# Patient Record
Sex: MALE | URBAN - METROPOLITAN AREA
[De-identification: names, ages, dates, MRNs, and addresses within clinical notes are randomized per-mention and may not be internally consistent; named-entity substitution may affect disease eponyms.]

---

## 2022-01-10 ENCOUNTER — INPATIENT (INPATIENT)
Facility: HOSPITAL | Age: 72
LOS: 2 days | Discharge: HOME | DRG: 280 | End: 2022-01-13
Attending: INTERNAL MEDICINE | Admitting: FAMILY MEDICINE
Payer: MEDICARE

## 2022-01-10 VITALS
OXYGEN SATURATION: 99 % | SYSTOLIC BLOOD PRESSURE: 149 MMHG | WEIGHT: 88.63 LBS | HEART RATE: 66 BPM | HEIGHT: 60 IN | DIASTOLIC BLOOD PRESSURE: 92 MMHG | RESPIRATION RATE: 18 BRPM | TEMPERATURE: 98 F

## 2022-01-10 DIAGNOSIS — F10.10 ALCOHOL ABUSE, UNCOMPLICATED: ICD-10-CM

## 2022-01-10 DIAGNOSIS — I25.2 OLD MYOCARDIAL INFARCTION: ICD-10-CM

## 2022-01-10 DIAGNOSIS — G40.309 GENERALIZED IDIOPATHIC EPILEPSY AND EPILEPTIC SYNDROMES, NOT INTRACTABLE, WITHOUT STATUS EPILEPTICUS: ICD-10-CM

## 2022-01-10 DIAGNOSIS — I25.10 ATHEROSCLEROTIC HEART DISEASE OF NATIVE CORONARY ARTERY WITHOUT ANGINA PECTORIS: ICD-10-CM

## 2022-01-10 DIAGNOSIS — Z91.14 PATIENT'S OTHER NONCOMPLIANCE WITH MEDICATION REGIMEN: ICD-10-CM

## 2022-01-10 DIAGNOSIS — E55.9 VITAMIN D DEFICIENCY, UNSPECIFIED: ICD-10-CM

## 2022-01-10 DIAGNOSIS — I21.4 NON-ST ELEVATION (NSTEMI) MYOCARDIAL INFARCTION: ICD-10-CM

## 2022-01-10 DIAGNOSIS — I73.9 PERIPHERAL VASCULAR DISEASE, UNSPECIFIED: ICD-10-CM

## 2022-01-10 DIAGNOSIS — I10 ESSENTIAL (PRIMARY) HYPERTENSION: ICD-10-CM

## 2022-01-10 DIAGNOSIS — I70.211 ATHEROSCLEROSIS OF NATIVE ARTERIES OF EXTREMITIES WITH INTERMITTENT CLAUDICATION, RIGHT LEG: ICD-10-CM

## 2022-01-10 DIAGNOSIS — M62.82 RHABDOMYOLYSIS: ICD-10-CM

## 2022-01-10 DIAGNOSIS — E43 UNSPECIFIED SEVERE PROTEIN-CALORIE MALNUTRITION: ICD-10-CM

## 2022-01-10 DIAGNOSIS — Z95.820 PERIPHERAL VASCULAR ANGIOPLASTY STATUS WITH IMPLANTS AND GRAFTS: ICD-10-CM

## 2022-01-10 DIAGNOSIS — F17.210 NICOTINE DEPENDENCE, CIGARETTES, UNCOMPLICATED: ICD-10-CM

## 2022-01-10 DIAGNOSIS — Z95.5 PRESENCE OF CORONARY ANGIOPLASTY IMPLANT AND GRAFT: ICD-10-CM

## 2022-01-10 DIAGNOSIS — D64.9 ANEMIA, UNSPECIFIED: ICD-10-CM

## 2022-01-10 PROCEDURE — 99223 1ST HOSP IP/OBS HIGH 75: CPT

## 2022-01-10 PROCEDURE — 86803 HEPATITIS C AB TEST: CPT

## 2022-01-10 PROCEDURE — 78452 HT MUSCLE IMAGE SPECT MULT: CPT

## 2022-01-10 PROCEDURE — 85027 COMPLETE CBC AUTOMATED: CPT

## 2022-01-10 PROCEDURE — 36415 COLL VENOUS BLD VENIPUNCTURE: CPT

## 2022-01-10 PROCEDURE — 84484 ASSAY OF TROPONIN QUANT: CPT

## 2022-01-10 PROCEDURE — 80053 COMPREHEN METABOLIC PANEL: CPT

## 2022-01-10 PROCEDURE — 85025 COMPLETE CBC W/AUTO DIFF WBC: CPT

## 2022-01-10 PROCEDURE — U0003: CPT

## 2022-01-10 PROCEDURE — 93017 CV STRESS TEST TRACING ONLY: CPT

## 2022-01-10 PROCEDURE — A9500: CPT

## 2022-01-10 PROCEDURE — 82550 ASSAY OF CK (CPK): CPT

## 2022-01-10 PROCEDURE — 80061 LIPID PANEL: CPT

## 2022-01-10 PROCEDURE — U0005: CPT

## 2022-01-10 PROCEDURE — 93306 TTE W/DOPPLER COMPLETE: CPT

## 2022-01-10 PROCEDURE — 84145 PROCALCITONIN (PCT): CPT

## 2022-01-10 PROCEDURE — 93005 ELECTROCARDIOGRAM TRACING: CPT

## 2022-01-10 PROCEDURE — 83036 HEMOGLOBIN GLYCOSYLATED A1C: CPT

## 2022-01-10 NOTE — H&P ADULT - REASON FOR ADMISSION
NSTEMI  Elevated Troponin  Seizure Disorder  ETOH Abuse NSTEMI  Elevated Troponin  Seizure Disorder  Possible Rhabdomyolysis  ETOH Abuse

## 2022-01-10 NOTE — H&P ADULT - NSHPSOCIALHISTORY_GEN_ALL_CORE
Pt lives with his wife.  He states he is an only child and he does not have children . He uses a cane.  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  He reports last marijuana and crack cocaine use as 2018 at which time he had an MI.  He denies IVDA or other drug use.  Pt drinks beer during the week. He reports 2 cigarettes/daily.

## 2022-01-10 NOTE — H&P ADULT - CONVERSATION DETAILS
Pt's wife Mag Wu, would be his HCP. Pt would like to be Full Code and he states his  wife Mag Wu, would be his HCP.

## 2022-01-10 NOTE — H&P ADULT - NSICDXPASTMEDICALHX_GEN_ALL_CORE_FT
PAST MEDICAL HISTORY:  Alcoholism     Hypertension     PUD (peptic ulcer disease)     Seizure disorder

## 2022-01-10 NOTE — H&P ADULT - HISTORY OF PRESENT ILLNESS
Pt is a pleasant 70 y/o AA Male with PMHX: HTN , MI,  PUD , Alcoholism who was transferred to [facility] from [facility] for Off Loading.  He was admitted to [facility] on 1/9/2022 after a Generalized Tonic Clonic seizure at home and again in the ED.    Pt had been non-compliant with his Keppra and other medications due to his alcohol use.   At [facility] , pt reportedly had chest pain,  an NSTEMI, mild MANUEL and concern for possible Rhabdomyolysis.   He was noted to have anemia H/H 10/35, MCV 79, WBC 13.8  In the ED he received Keppra loading dose of 1000 mg,  NS of 1L bolus.   Pt was AAOx3,  and had a CIWA score of 0.  Pt had a troponin increase from 293 to 2193, and  to 1,368.     He was started on plavix and ASA , then continued with plavix 75mg and lovenox 50 mg .    Initial EKG:  NSR 70 bpm om 1/9/2022 then NSR 65 bpm on 1/10/2022.    Repeat labs also reveal Vit D, 25 hydroxy  13.0,  TSH 0.395,  F T4 1.35, Bun 17.8, Cr 1.33,  GFR 61.0.  Cxray showed probably basilar atelectasis,  CT brain:  mild diffuse cerebral atrophy, chronic white matter microvascular disease.         Pt has no complaints at this time and reports he last drank alcohol three days ago, a can of beer.  He denies current chest pain, no abd pain, no calf pain/swelling.  No fever/chills, no respiratory, urinary or abdominal symptoms.      Pt is covid neg and has been vaccinated.  Pt is a pleasant 70 y/o AA Male with PMHX: HTN , MI,  PUD , Alcoholism who was transferred to [facility] from [facility] for Off Loading.  He was admitted to [facility] on 1/9/2022 after a Generalized Tonic Clonic seizure at home and again in the ED.    Pt had been non-compliant with his Keppra and other medications due to his alcohol use.   At [facility] , pt reportedly had chest pain,  an NSTEMI, mild MANUEL and concern for possible Rhabdomyolysis.   He was noted to have anemia H/H 10/35, MCV 79, WBC 13.8  In the ED he received Keppra loading dose of 1000 mg,  NS of 1L bolus.   Pt was AAOx3,  and had a CIWA score of 0.  Pt had a troponin increase from 293 to 2193, and  to 1,368.     He was started on plavix and ASA , then continued with plavix 75mg and lovenox 50 mg .    Initial EKG:  NSR 70 bpm om 1/9/2022 then NSR 65 bpm on 1/10/2022.    Repeat labs also reveal Vit D, 25 hydroxy  13.0,  TSH 0.395,  F T4 1.35, Bun 17.8, Cr 1.33,  GFR 61.0.  Cxray showed probably basilar atelectasis,  CT brain:  mild diffuse cerebral atrophy, chronic white matter microvascular disease.         Pt has no complaints at this time and reports he last drank alcohol three days ago, a can of beer.  He denies current chest pain, no abd pain, no calf pain/swelling.  No fever/chills, no respiratory, urinary or abdominal symptoms.        Pt is covid neg and has been vaccinated.

## 2022-01-10 NOTE — H&P ADULT - ASSESSMENT
Pt is admitted w/    NSTEMI  Elevated Troponin  Seizure Disorder  Possible Rhabdomyolysis  ETOH Abuse  CIWA protocol PRN  -direct admit,   admitted to telemetry  - cont Keppra 500mg po BID  - cont  ml/hr  - follow CPK and troponins, renal function  - cont lovenox 50 units,plavix 75mg,  metoprolol  - echo and cardiology consult  - cont thiamine, folate, MVI,  Mag  - DVT proph: pt on lovenox  - Adv Dir: Full Code Pt is admitted w/    NSTEMI  Elevated Troponin  Seizure Disorder  Possible Rhabdomyolysis  ETOH Abuse  CIWA protocol PRN  -direct admit,   admitted to telemetry  - cont Keppra 500mg po BID  - seizure precautions  - cont  ml/hr  - follow CPK and troponins, renal function  - cont lovenox 50 units,plavix 75mg,  metoprolol  - echo and cardiology consult  - cont thiamine, folate, MVI,  Mag  - f/u blood cx drawn 1/9/22 from [facility]  - DVT proph: pt on lovenox  - Adv Dir: Full Code Pt is admitted w/    NSTEMI  Elevated Troponin  Seizure Disorder  Possible Rhabdomyolysis  ETOH Abuse  CIWA protocol PRN  VIt D defic  -direct admit,   admitted to telemetry  - cont Keppra 500mg po BID  - seizure precautions  - cont  ml/hr  - follow CPK and troponins, renal function  - cont lovenox 50 units,plavix 75mg,  metoprolol  - echo and cardiology consult, check fasting lipids  - add statin if LFTS wnl  - cont thiamine, folate, MVI,  Mag, Vit D  - f/u blood cx drawn 1/9/22 from [facility]  - DVT proph: pt on lovenox  - Adv Dir: Full Code

## 2022-01-10 NOTE — PATIENT PROFILE ADULT - FALL HARM RISK - HARM RISK INTERVENTIONS

## 2022-01-10 NOTE — H&P ADULT - NSHPPHYSICALEXAM_GEN_ALL_CORE
ICU Vital Signs Last 24 Hrs  T(C): 36.8 (10 Luis 2022 22:38), Max: 36.8 (10 Luis 2022 22:38)  T(F): 98.2 (10 Luis 2022 22:38), Max: 98.2 (10 Luis 2022 22:38)  HR: 66 (10 Luis 2022 22:38) (66 - 66)  BP: 149/92 (10 Luis 2022 22:38) (149/92 - 149/92)    RR: 18 (10 Luis 2022 22:38) (18 - 18)  SpO2: 99% (10 Luis 2022 22:38) (99% - 99%) ICU Vital Signs Last 24 Hrs  T(C): 36.8 (10 Luis 2022 22:38), Max: 36.8 (10 Luis 2022 22:38)  T(F): 98.2 (10 Luis 2022 22:38), Max: 98.2 (10 Luis 2022 22:38)  HR: 66 (10 Luis 2022 22:38) (66 - 66)  BP: 149/92 (10 Luis 2022 22:38) (149/92 - 149/92)  RR: 18 (10 Luis 2022 22:38) (18 - 18)  SpO2: 99% (10 Luis 2022 22:38) (99% - 99%)      PHYSICAL EXAM:  Constitutional: pt is a slender  male with short stature  Eyes: DORIAN  ENMT: ears, nose externally wnl, membranes ,  pt has poor dentition and is missing many teeth  Neck:supple, no JVD  Breasts: not examined  Back: no deformity noted,  pt able to sit up without assistance  Respiratory: CTA  Cardiovascular: S1S2, no murmer  Gastrointestinal: soft, non-tender , + bs,  healed midline abd scar.  Genitourinary: normal male genitalia  Rectal: deferred  Extremities:  negative edema, no calf pain,  clubbing of fingers noted  Neurological: AAOx3,  no focal deficits noted  Skin: no acute rash   Musculoskeletal: FROM  Psychiatric: AAOx3 ICU Vital Signs Last 24 Hrs  T(C): 36.8 (10 Luis 2022 22:38), Max: 36.8 (10 Luis 2022 22:38)  T(F): 98.2 (10 Luis 2022 22:38), Max: 98.2 (10 Luis 2022 22:38)  HR: 66 (10 Luis 2022 22:38) (66 - 66)  BP: 149/92 (10 Luis 2022 22:38) (149/92 - 149/92)  RR: 18 (10 Luis 2022 22:38) (18 - 18)  SpO2: 99% (10 Luis 2022 22:38) (99% - 99%)      PHYSICAL EXAM:  Constitutional: pt is a slender  male with short stature  Eyes: DORIAN  ENMT: ears, nose externally wnl, membranes ,  pt has poor dentition and is missing many teeth  Neck:supple, no JVD  Breasts: not examined  Back: no deformity noted,  pt able to sit up without assistance  Respiratory: CTA  Cardiovascular: S1S2, no murmer  Gastrointestinal: soft, non-tender , + bs,  healed midline abd scar.  Genitourinary: normal male genitalia  Rectal: deferred  Extremities:  negative edema, no calf pain,  clubbing of fingers noted  Neurological: AAOx3,  no focal deficits noted, no tremor, no tongue fasciculations, speech fluent and clear  Skin: no acute rash   Musculoskeletal: FROM  Psychiatric: AAOx3,  following commands ICU Vital Signs Last 24 Hrs  T(C): 36.8 (10 Luis 2022 22:38), Max: 36.8 (10 Luis 2022 22:38)  T(F): 98.2 (10 Luis 2022 22:38), Max: 98.2 (10 Luis 2022 22:38)  HR: 66 (10 Luis 2022 22:38) (66 - 66)  BP: 149/92 (10 Luis 2022 22:38) (149/92 - 149/92)  RR: 18 (10 Luis 2022 22:38) (18 - 18)  SpO2: 99% (10 Luis 2022 22:38) (99% - 99%)    Height 4' 10""  Weight 40 kg    PHYSICAL EXAM:  Constitutional: pt is a slender  male with short stature  Eyes: DORIAN  ENMT: ears, nose externally wnl, membranes ,  pt has poor dentition and is missing many teeth  Neck:supple, no JVD  Breasts: not examined  Back: no deformity noted,  pt able to sit up without assistance  Respiratory: CTA  Cardiovascular: S1S2, no murmer  Gastrointestinal: soft, non-tender , + bs,  healed midline abd scar.  Genitourinary: normal male genitalia  Rectal: deferred  Extremities:  negative edema, no calf pain,  clubbing of fingers noted  Neurological: AAOx3,  no focal deficits noted, no tremor, no tongue fasciculations, speech fluent and clear  Skin: no acute rash   Musculoskeletal: FROM  Psychiatric: AAOx3,  following commands

## 2022-01-11 DIAGNOSIS — I73.9 PERIPHERAL VASCULAR DISEASE, UNSPECIFIED: ICD-10-CM

## 2022-01-11 DIAGNOSIS — I10 ESSENTIAL (PRIMARY) HYPERTENSION: ICD-10-CM

## 2022-01-11 DIAGNOSIS — R77.8 OTHER SPECIFIED ABNORMALITIES OF PLASMA PROTEINS: ICD-10-CM

## 2022-01-11 DIAGNOSIS — I25.10 ATHEROSCLEROTIC HEART DISEASE OF NATIVE CORONARY ARTERY WITHOUT ANGINA PECTORIS: ICD-10-CM

## 2022-01-11 DIAGNOSIS — K27.9 PEPTIC ULCER, SITE UNSPECIFIED, UNSPECIFIED AS ACUTE OR CHRONIC, WITHOUT HEMORRHAGE OR PERFORATION: Chronic | ICD-10-CM

## 2022-01-11 LAB
A1C WITH ESTIMATED AVERAGE GLUCOSE RESULT: 5.4 % — SIGNIFICANT CHANGE UP (ref 4–5.6)
ALBUMIN SERPL ELPH-MCNC: 2.9 G/DL — LOW (ref 3.3–5)
ALP SERPL-CCNC: 95 U/L — SIGNIFICANT CHANGE UP (ref 40–120)
ALT FLD-CCNC: 32 U/L — SIGNIFICANT CHANGE UP (ref 12–78)
ANION GAP SERPL CALC-SCNC: 6 MMOL/L — SIGNIFICANT CHANGE UP (ref 5–17)
AST SERPL-CCNC: 47 U/L — HIGH (ref 15–37)
BILIRUB SERPL-MCNC: 0.3 MG/DL — SIGNIFICANT CHANGE UP (ref 0.2–1.2)
BUN SERPL-MCNC: 16 MG/DL — SIGNIFICANT CHANGE UP (ref 7–23)
CALCIUM SERPL-MCNC: 8.5 MG/DL — SIGNIFICANT CHANGE UP (ref 8.5–10.1)
CHLORIDE SERPL-SCNC: 107 MMOL/L — SIGNIFICANT CHANGE UP (ref 96–108)
CHOLEST SERPL-MCNC: 172 MG/DL — SIGNIFICANT CHANGE UP
CK SERPL-CCNC: 1209 U/L — HIGH (ref 26–308)
CO2 SERPL-SCNC: 24 MMOL/L — SIGNIFICANT CHANGE UP (ref 22–31)
CREAT SERPL-MCNC: 1.32 MG/DL — HIGH (ref 0.5–1.3)
ESTIMATED AVERAGE GLUCOSE: 108 MG/DL — SIGNIFICANT CHANGE UP (ref 68–114)
GLUCOSE SERPL-MCNC: 94 MG/DL — SIGNIFICANT CHANGE UP (ref 70–99)
HCT VFR BLD CALC: 29.1 % — LOW (ref 39–50)
HCV AB S/CO SERPL IA: 0.69 S/CO — SIGNIFICANT CHANGE UP (ref 0–0.99)
HCV AB SERPL-IMP: SIGNIFICANT CHANGE UP
HDLC SERPL-MCNC: 92 MG/DL — SIGNIFICANT CHANGE UP
HGB BLD-MCNC: 8.4 G/DL — LOW (ref 13–17)
LIPID PNL WITH DIRECT LDL SERPL: 60 MG/DL — SIGNIFICANT CHANGE UP
MCHC RBC-ENTMCNC: 22.5 PG — LOW (ref 27–34)
MCHC RBC-ENTMCNC: 28.9 GM/DL — LOW (ref 32–36)
MCV RBC AUTO: 78 FL — LOW (ref 80–100)
NON HDL CHOLESTEROL: 81 MG/DL — SIGNIFICANT CHANGE UP
PLATELET # BLD AUTO: 342 K/UL — SIGNIFICANT CHANGE UP (ref 150–400)
POTASSIUM SERPL-MCNC: 4 MMOL/L — SIGNIFICANT CHANGE UP (ref 3.5–5.3)
POTASSIUM SERPL-SCNC: 4 MMOL/L — SIGNIFICANT CHANGE UP (ref 3.5–5.3)
PROT SERPL-MCNC: 7.3 GM/DL — SIGNIFICANT CHANGE UP (ref 6–8.3)
RBC # BLD: 3.73 M/UL — LOW (ref 4.2–5.8)
RBC # FLD: 20 % — HIGH (ref 10.3–14.5)
SARS-COV-2 RNA SPEC QL NAA+PROBE: SIGNIFICANT CHANGE UP
SODIUM SERPL-SCNC: 137 MMOL/L — SIGNIFICANT CHANGE UP (ref 135–145)
TRIGL SERPL-MCNC: 104 MG/DL — SIGNIFICANT CHANGE UP
TROPONIN I, HIGH SENSITIVITY RESULT: 1089.83 NG/L — HIGH
TROPONIN I, HIGH SENSITIVITY RESULT: 1351.1 NG/L — HIGH
WBC # BLD: 9.84 K/UL — SIGNIFICANT CHANGE UP (ref 3.8–10.5)
WBC # FLD AUTO: 9.84 K/UL — SIGNIFICANT CHANGE UP (ref 3.8–10.5)

## 2022-01-11 PROCEDURE — 93306 TTE W/DOPPLER COMPLETE: CPT | Mod: 26

## 2022-01-11 PROCEDURE — 99223 1ST HOSP IP/OBS HIGH 75: CPT

## 2022-01-11 PROCEDURE — 93010 ELECTROCARDIOGRAM REPORT: CPT

## 2022-01-11 PROCEDURE — 99232 SBSQ HOSP IP/OBS MODERATE 35: CPT

## 2022-01-11 RX ORDER — METOPROLOL TARTRATE 50 MG
25 TABLET ORAL EVERY 12 HOURS
Refills: 0 | Status: DISCONTINUED | OUTPATIENT
Start: 2022-01-10 | End: 2022-01-13

## 2022-01-11 RX ORDER — ATORVASTATIN CALCIUM 80 MG/1
20 TABLET, FILM COATED ORAL AT BEDTIME
Refills: 0 | Status: DISCONTINUED | OUTPATIENT
Start: 2022-01-10 | End: 2022-01-11

## 2022-01-11 RX ORDER — ENOXAPARIN SODIUM 100 MG/ML
50 INJECTION SUBCUTANEOUS DAILY
Refills: 0 | Status: DISCONTINUED | OUTPATIENT
Start: 2022-01-11 | End: 2022-01-11

## 2022-01-11 RX ORDER — MAGNESIUM OXIDE 400 MG ORAL TABLET 241.3 MG
400 TABLET ORAL
Refills: 0 | Status: DISCONTINUED | OUTPATIENT
Start: 2022-01-11 | End: 2022-01-13

## 2022-01-11 RX ORDER — LEVETIRACETAM 250 MG/1
500 TABLET, FILM COATED ORAL
Refills: 0 | Status: DISCONTINUED | OUTPATIENT
Start: 2022-01-11 | End: 2022-01-13

## 2022-01-11 RX ORDER — THIAMINE MONONITRATE (VIT B1) 100 MG
100 TABLET ORAL DAILY
Refills: 0 | Status: DISCONTINUED | OUTPATIENT
Start: 2022-01-11 | End: 2022-01-13

## 2022-01-11 RX ORDER — FOLIC ACID 0.8 MG
1 TABLET ORAL DAILY
Refills: 0 | Status: DISCONTINUED | OUTPATIENT
Start: 2022-01-11 | End: 2022-01-13

## 2022-01-11 RX ORDER — FOLIC ACID 0.8 MG
1 TABLET ORAL
Qty: 0 | Refills: 0 | DISCHARGE

## 2022-01-11 RX ORDER — ENOXAPARIN SODIUM 100 MG/ML
40 INJECTION SUBCUTANEOUS
Refills: 0 | Status: DISCONTINUED | OUTPATIENT
Start: 2022-01-11 | End: 2022-01-13

## 2022-01-11 RX ORDER — CLOPIDOGREL BISULFATE 75 MG/1
75 TABLET, FILM COATED ORAL DAILY
Refills: 0 | Status: DISCONTINUED | OUTPATIENT
Start: 2022-01-10 | End: 2022-01-13

## 2022-01-11 RX ORDER — MAGNESIUM OXIDE 400 MG ORAL TABLET 241.3 MG
1 TABLET ORAL
Qty: 0 | Refills: 0 | DISCHARGE

## 2022-01-11 RX ORDER — CHOLECALCIFEROL (VITAMIN D3) 125 MCG
6000 CAPSULE ORAL DAILY
Refills: 0 | Status: DISCONTINUED | OUTPATIENT
Start: 2022-01-11 | End: 2022-01-13

## 2022-01-11 RX ORDER — SODIUM CHLORIDE 9 MG/ML
1000 INJECTION INTRAMUSCULAR; INTRAVENOUS; SUBCUTANEOUS
Refills: 0 | Status: DISCONTINUED | OUTPATIENT
Start: 2022-01-10 | End: 2022-01-12

## 2022-01-11 RX ORDER — THIAMINE MONONITRATE (VIT B1) 100 MG
1 TABLET ORAL
Qty: 0 | Refills: 0 | DISCHARGE

## 2022-01-11 RX ADMIN — SODIUM CHLORIDE 75 MILLILITER(S): 9 INJECTION INTRAMUSCULAR; INTRAVENOUS; SUBCUTANEOUS at 14:03

## 2022-01-11 RX ADMIN — MAGNESIUM OXIDE 400 MG ORAL TABLET 400 MILLIGRAM(S): 241.3 TABLET ORAL at 10:24

## 2022-01-11 RX ADMIN — SODIUM CHLORIDE 75 MILLILITER(S): 9 INJECTION INTRAMUSCULAR; INTRAVENOUS; SUBCUTANEOUS at 01:48

## 2022-01-11 RX ADMIN — Medication 1 MILLIGRAM(S): at 10:27

## 2022-01-11 RX ADMIN — ENOXAPARIN SODIUM 40 MILLIGRAM(S): 100 INJECTION SUBCUTANEOUS at 10:26

## 2022-01-11 RX ADMIN — Medication 6000 UNIT(S): at 10:25

## 2022-01-11 RX ADMIN — LEVETIRACETAM 500 MILLIGRAM(S): 250 TABLET, FILM COATED ORAL at 01:48

## 2022-01-11 RX ADMIN — CLOPIDOGREL BISULFATE 75 MILLIGRAM(S): 75 TABLET, FILM COATED ORAL at 10:25

## 2022-01-11 RX ADMIN — Medication 25 MILLIGRAM(S): at 10:27

## 2022-01-11 RX ADMIN — Medication 100 MILLIGRAM(S): at 10:27

## 2022-01-11 RX ADMIN — ATORVASTATIN CALCIUM 20 MILLIGRAM(S): 80 TABLET, FILM COATED ORAL at 00:25

## 2022-01-11 RX ADMIN — Medication 25 MILLIGRAM(S): at 01:48

## 2022-01-11 RX ADMIN — LEVETIRACETAM 500 MILLIGRAM(S): 250 TABLET, FILM COATED ORAL at 10:27

## 2022-01-11 RX ADMIN — Medication 25 MILLIGRAM(S): at 22:06

## 2022-01-11 RX ADMIN — LEVETIRACETAM 500 MILLIGRAM(S): 250 TABLET, FILM COATED ORAL at 22:06

## 2022-01-11 RX ADMIN — ENOXAPARIN SODIUM 40 MILLIGRAM(S): 100 INJECTION SUBCUTANEOUS at 22:06

## 2022-01-11 RX ADMIN — MAGNESIUM OXIDE 400 MG ORAL TABLET 400 MILLIGRAM(S): 241.3 TABLET ORAL at 18:35

## 2022-01-11 RX ADMIN — Medication 1 TABLET(S): at 10:27

## 2022-01-11 NOTE — PROGRESS NOTE ADULT - SUBJECTIVE AND OBJECTIVE BOX
Subjective:  Patient is a 71y old  Male who presents with a chief complaint of NSTEMI  Elevated Troponin  Seizure Disorder  Possible Rhabdomyolysis  ETOH Abuse (11 Jan 2022 14:42)    HPI:  Pt is a pleasant 72 y/o AA Male with PMHX: HTN , MI,  PUD , Alcoholism who was transferred to [facility] from [facility] for Off Loading.  He was admitted to [facility] on 1/9/2022 after a Generalized Tonic Clonic seizure at home and again in the ED.    Pt had been non-compliant with his Keppra and other medications due to his alcohol use.   At [facility] , pt reportedly had chest pain,  an NSTEMI, mild MANUEL and concern for possible Rhabdomyolysis.   He was noted to have anemia H/H 10/35, MCV 79, WBC 13.8  In the ED he received Keppra loading dose of 1000 mg,  NS of 1L bolus.   Pt was AAOx3,  and had a CIWA score of 0.  Pt had a troponin increase from 293 to 2193, and  to 1,368.     He was started on plavix and ASA , then continued with plavix 75mg and lovenox 50 mg .    Initial EKG:  NSR 70 bpm om 1/9/2022 then NSR 65 bpm on 1/10/2022.    Repeat labs also reveal Vit D, 25 hydroxy  13.0,  TSH 0.395,  F T4 1.35, Bun 17.8, Cr 1.33,  GFR 61.0.  Cxray showed probably basilar atelectasis,  CT brain:  mild diffuse cerebral atrophy, chronic white matter microvascular disease.         Pt has no complaints at this time and reports he last drank alcohol three days ago, a can of beer.  He denies current chest pain, no abd pain, no calf pain/swelling.  No fever/chills, no respiratory, urinary or abdominal symptoms.        Pt is covid neg and has been vaccinated.  (10 Luis 2022 23:56)    Patient seen and examined at bedside earlier today,   Feeling better , no chest pain     OBJECTIVE:   Vitals reviewed  PHYSICAL EXAM:  GENERAL: NAD  NERVOUS SYSTEM:  Alert & Oriented X3, non- focal exam,  Motor Strength 5/5 B/L upper and lower extremities; DTRs 2+ intact and symmetric  HEAD:  Atraumatic, Normocephalic  EYES: EOMI, PERRLA, conjunctiva and sclera clear  HEENT: Moist mucous membranes  NECK: Supple, No JVD  CHEST/LUNG: Clear to auscultation bilaterally; No rales, no rhonchi, no wheezing  HEART: Regular rate and rhythm; No murmurs, no rubs or gallops  ABDOMEN: Soft, Nontender, Nondistended; Bowel sounds present  GENITOURINARY- Voiding, no suprapubic tenderness  EXTREMITIES:  2+ Peripheral Pulses, No clubbing, cyanosis,   edema  MUSCULOSKELETAL:- No muscle tenderness, Muscle tone normal, No joint tenderness, no Joint swelling,  Joint ROM -normal  SKIN-no rash, no lesion    LABS: all reviewed                         8.4    9.84  )-----------( 342      ( 11 Jan 2022 11:15 )             29.1     01-11    137  |  107  |  16  ----------------------------<  94  4.0   |  24  |  1.32<H>    Ca    8.5      11 Jan 2022 11:15    TPro  7.3  /  Alb  2.9<L>  /  TBili  0.3  /  DBili  x   /  AST  47<H>  /  ALT  32  /  AlkPhos  95  01-11      CARDIAC MARKERS ( 11 Jan 2022 00:19 )  x     / x     / 1209 U/L / x     / x              CAPILLARY BLOOD GLUCOSE            RECENT CULTURES:    RADIOLOGY & ADDITIONAL TESTS: all reviewed   EKG reviewed        Current medications:  cholecalciferol 6000 Unit(s) Oral daily  clopidogrel Tablet 75 milliGRAM(s) Oral daily  enoxaparin Injectable 40 milliGRAM(s) SubCutaneous two times a day  folic acid 1 milliGRAM(s) Oral daily  levETIRAcetam 500 milliGRAM(s) Oral two times a day  LORazepam     Tablet 1 milliGRAM(s) Oral every 2 hours PRN  LORazepam     Tablet 1 milliGRAM(s) Oral every 1 hour PRN  LORazepam     Tablet 2 milliGRAM(s) Oral every 2 hours PRN  LORazepam   Injectable 1 milliGRAM(s) IV Push once  magnesium oxide 400 milliGRAM(s) Oral two times a day with meals  metoprolol tartrate 25 milliGRAM(s) Oral every 12 hours  multivitamin 1 Tablet(s) Oral daily  sodium chloride 0.9%. 1000 milliLiter(s) IV Continuous <Continuous>  thiamine 100 milliGRAM(s) Oral daily

## 2022-01-11 NOTE — CONSULT NOTE ADULT - PROBLEM SELECTOR RECOMMENDATION 9
elevated troponin with prior hx of CAD PCI in setting of seizure episode without chest pain ?? no significant ST T changes on ekg , possible demand related ischemia , can not rule out NSTEMI , agree with continue plavix lovenox , statin , will obtain echo to assess ventricular function ,  chemical stress test to rule out ischemia

## 2022-01-11 NOTE — PROGRESS NOTE ADULT - ASSESSMENT
# NSTEMI Elevated Troponin  - CAD (coronary artery disease).   ·  Recommendation: prior hx of PCI 2 to3 years no details available , advised the patient to quit smoking , continue statin plavix  follow up echo ,  -direct admit,   admitted to telemetry  - cont  ml/hr  - follow CPK and troponins, renal function  - cont lovenox 50 units,plavix 75mg,  metoprolol  - echo and cardiology consult, check fasting lipids  - add statin if LFTS wnl  - Consulted     # Nicotine dependance   -advised the patient to quit smoking    # Peripheral arterial disease.    with intermittent claudication of RLE with prior hx of arterial stent ,   continue plavix , encourage to quit smoking.    # Seizure Disorder  -PAtient restarted on Keppra   - - cont Keppra 500mg po BID  - seizure precautions    # ETOH Abuse  - on CIWA protocol   - cont thiamine, folate, MVI,  Mag, Vit D    #VIt D defic  -c/w Vitamine Vitamin D3  - DVT proph: pt on lovenox  - Adv Dir: Full Code

## 2022-01-11 NOTE — DIETITIAN NUTRITION RISK NOTIFICATION - TREATMENT: THE FOLLOWING DIET HAS BEEN RECOMMENDED
Diet, Regular:   Low Fat (LOWFAT)  Low Sodium     Special Instructions for Nursing:  Low Fat (LOWFAT) (01-11-22 @ 00:11) [Active]

## 2022-01-11 NOTE — CONSULT NOTE ADULT - PROBLEM SELECTOR RECOMMENDATION 3
with intermittent claudication of RLE with prior hx of arterial stent , continue plavix , encourage to quit smoking

## 2022-01-11 NOTE — DIETITIAN INITIAL EVALUATION ADULT. - PERTINENT LABORATORY DATA
01-11    137  |  107  |  16  ----------------------------<  94  4.0   |  24  |  1.32<H>    Ca    8.5      11 Jan 2022 11:15    TPro  7.3  /  Alb  2.9<L>  /  TBili  0.3  /  DBili  x   /  AST  47<H>  /  ALT  32  /  AlkPhos  95  01-11

## 2022-01-11 NOTE — DIETITIAN INITIAL EVALUATION ADULT. - OTHER INFO
Pt is a pleasant 72 y/o AA Male with PMHX: HTN , MI,  PUD , Alcoholism who was transferred to [facility] from [facility] for Off Loading.  He was admitted to [facility] on 1/9/2022 after a Generalized Tonic Clonic seizure at home and again in the ED.    Pt had been non-compliant with his Keppra and other medications due to his alcohol use.   At [facility] , pt reportedly had chest pain,  an NSTEMI, mild MANUEL and concern for possible Rhabdomyolysis.   He was noted to have anemia H/H 10/35, MCV 79, WBC 13.8  In the ED he received Keppra loading dose of 1000 mg,  NS of 1L bolus.   Pt was AAOx3,  and had a CIWA score of 0.  Pt had a troponin increase from 293 to 2193, and  to 1,368.     He was started on plavix and ASA , then continued with plavix 75mg and lovenox 50 mg .    Initial EKG:  NSR 70 bpm om 1/9/2022 then NSR 65 bpm on 1/10/2022.    Repeat labs also reveal Vit D, 25 hydroxy  13.0,  TSH 0.395,  F T4 1.35, Bun 17.8, Cr 1.33,  GFR 61.0.  Cxray showed probably basilar atelectasis,  CT brain:  mild diffuse cerebral atrophy, chronic white matter microvascular disease.         Pt seen for assessment 2/2 to BMI. Pt overall reports poor PO intake over the last month with associated weight loss (noted above). Pt missing teeth but states he has been able to eat the food in the hospital without difficulty. Pt denies issues with swallowing and denies n/v/d/c. Pt denies food allergies. Pt presents with wasting (noted more severe LE wasting). Pt noted with issues of alcoholism and likely major contributer to malnutrition and poor PO intake. RD recommends Ensure Enlive TID, monitor and encourage PO intake and small meals. Monitor weight. RD will continue to follow patient

## 2022-01-11 NOTE — CONSULT NOTE ADULT - PROBLEM SELECTOR RECOMMENDATION 2
prior hx of PCI 2 to3 years no details available , advised the patient to quit smoking , continue statin plavix  follow up echo ,

## 2022-01-11 NOTE — DIETITIAN INITIAL EVALUATION ADULT. - MALNUTRITION
Meets criteria for Severe protein-calorie malnutrition in the context of social and environmental circumstance; presents with severe muscle and fat wasting, clinically sig weight loss and poor PO

## 2022-01-11 NOTE — DIETITIAN INITIAL EVALUATION ADULT. - PERTINENT MEDS FT
MEDICATIONS  (STANDING):  cholecalciferol 6000 Unit(s) Oral daily  clopidogrel Tablet 75 milliGRAM(s) Oral daily  enoxaparin Injectable 40 milliGRAM(s) SubCutaneous two times a day  folic acid 1 milliGRAM(s) Oral daily  levETIRAcetam 500 milliGRAM(s) Oral two times a day  LORazepam   Injectable 1 milliGRAM(s) IV Push once  magnesium oxide 400 milliGRAM(s) Oral two times a day with meals  metoprolol tartrate 25 milliGRAM(s) Oral every 12 hours  multivitamin 1 Tablet(s) Oral daily  sodium chloride 0.9%. 1000 milliLiter(s) (75 mL/Hr) IV Continuous <Continuous>  thiamine 100 milliGRAM(s) Oral daily    MEDICATIONS  (PRN):  LORazepam     Tablet 1 milliGRAM(s) Oral every 2 hours PRN CIWA-Ar score increase by 2 points and a total score of 7 or less  LORazepam     Tablet 1 milliGRAM(s) Oral every 1 hour PRN CIWA-Ar score 8 or greater  LORazepam     Tablet 2 milliGRAM(s) Oral every 2 hours PRN Symptom-triggered: 2 point increase in CIWA -Ar score and a total score of 7 or LESS

## 2022-01-12 LAB
ALBUMIN SERPL ELPH-MCNC: 2.8 G/DL — LOW (ref 3.3–5)
ALP SERPL-CCNC: 96 U/L — SIGNIFICANT CHANGE UP (ref 40–120)
ALT FLD-CCNC: 34 U/L — SIGNIFICANT CHANGE UP (ref 12–78)
ANION GAP SERPL CALC-SCNC: 7 MMOL/L — SIGNIFICANT CHANGE UP (ref 5–17)
AST SERPL-CCNC: 49 U/L — HIGH (ref 15–37)
BILIRUB SERPL-MCNC: 0.3 MG/DL — SIGNIFICANT CHANGE UP (ref 0.2–1.2)
BUN SERPL-MCNC: 15 MG/DL — SIGNIFICANT CHANGE UP (ref 7–23)
CALCIUM SERPL-MCNC: 9 MG/DL — SIGNIFICANT CHANGE UP (ref 8.5–10.1)
CHLORIDE SERPL-SCNC: 106 MMOL/L — SIGNIFICANT CHANGE UP (ref 96–108)
CK SERPL-CCNC: 1991 U/L — HIGH (ref 26–308)
CK SERPL-CCNC: 2079 U/L — HIGH (ref 26–308)
CO2 SERPL-SCNC: 23 MMOL/L — SIGNIFICANT CHANGE UP (ref 22–31)
CREAT SERPL-MCNC: 1.35 MG/DL — HIGH (ref 0.5–1.3)
GLUCOSE SERPL-MCNC: 93 MG/DL — SIGNIFICANT CHANGE UP (ref 70–99)
HCT VFR BLD CALC: 28.1 % — LOW (ref 39–50)
HGB BLD-MCNC: 8.4 G/DL — LOW (ref 13–17)
MCHC RBC-ENTMCNC: 23.3 PG — LOW (ref 27–34)
MCHC RBC-ENTMCNC: 29.9 GM/DL — LOW (ref 32–36)
MCV RBC AUTO: 77.8 FL — LOW (ref 80–100)
PLATELET # BLD AUTO: 317 K/UL — SIGNIFICANT CHANGE UP (ref 150–400)
POTASSIUM SERPL-MCNC: 3.7 MMOL/L — SIGNIFICANT CHANGE UP (ref 3.5–5.3)
POTASSIUM SERPL-SCNC: 3.7 MMOL/L — SIGNIFICANT CHANGE UP (ref 3.5–5.3)
PROT SERPL-MCNC: 7.1 GM/DL — SIGNIFICANT CHANGE UP (ref 6–8.3)
RBC # BLD: 3.61 M/UL — LOW (ref 4.2–5.8)
RBC # FLD: 20.1 % — HIGH (ref 10.3–14.5)
SODIUM SERPL-SCNC: 136 MMOL/L — SIGNIFICANT CHANGE UP (ref 135–145)
WBC # BLD: 13.57 K/UL — HIGH (ref 3.8–10.5)
WBC # FLD AUTO: 13.57 K/UL — HIGH (ref 3.8–10.5)

## 2022-01-12 PROCEDURE — 99233 SBSQ HOSP IP/OBS HIGH 50: CPT

## 2022-01-12 PROCEDURE — 93010 ELECTROCARDIOGRAM REPORT: CPT

## 2022-01-12 PROCEDURE — 99223 1ST HOSP IP/OBS HIGH 75: CPT

## 2022-01-12 PROCEDURE — 99232 SBSQ HOSP IP/OBS MODERATE 35: CPT

## 2022-01-12 RX ORDER — SODIUM CHLORIDE 9 MG/ML
1000 INJECTION INTRAMUSCULAR; INTRAVENOUS; SUBCUTANEOUS
Refills: 0 | Status: DISCONTINUED | OUTPATIENT
Start: 2022-01-12 | End: 2022-01-13

## 2022-01-12 RX ORDER — AMLODIPINE BESYLATE 2.5 MG/1
5 TABLET ORAL DAILY
Refills: 0 | Status: DISCONTINUED | OUTPATIENT
Start: 2022-01-12 | End: 2022-01-13

## 2022-01-12 RX ADMIN — Medication 1 MILLIGRAM(S): at 10:28

## 2022-01-12 RX ADMIN — Medication 25 MILLIGRAM(S): at 10:29

## 2022-01-12 RX ADMIN — Medication 25 MILLIGRAM(S): at 21:07

## 2022-01-12 RX ADMIN — MAGNESIUM OXIDE 400 MG ORAL TABLET 400 MILLIGRAM(S): 241.3 TABLET ORAL at 08:54

## 2022-01-12 RX ADMIN — SODIUM CHLORIDE 100 MILLILITER(S): 9 INJECTION INTRAMUSCULAR; INTRAVENOUS; SUBCUTANEOUS at 18:49

## 2022-01-12 RX ADMIN — LEVETIRACETAM 500 MILLIGRAM(S): 250 TABLET, FILM COATED ORAL at 21:05

## 2022-01-12 RX ADMIN — LEVETIRACETAM 500 MILLIGRAM(S): 250 TABLET, FILM COATED ORAL at 10:29

## 2022-01-12 RX ADMIN — Medication 100 MILLIGRAM(S): at 10:28

## 2022-01-12 RX ADMIN — ENOXAPARIN SODIUM 40 MILLIGRAM(S): 100 INJECTION SUBCUTANEOUS at 21:07

## 2022-01-12 RX ADMIN — Medication 1 TABLET(S): at 10:29

## 2022-01-12 RX ADMIN — ENOXAPARIN SODIUM 40 MILLIGRAM(S): 100 INJECTION SUBCUTANEOUS at 10:29

## 2022-01-12 RX ADMIN — CLOPIDOGREL BISULFATE 75 MILLIGRAM(S): 75 TABLET, FILM COATED ORAL at 10:28

## 2022-01-12 RX ADMIN — AMLODIPINE BESYLATE 5 MILLIGRAM(S): 2.5 TABLET ORAL at 18:51

## 2022-01-12 RX ADMIN — Medication 6000 UNIT(S): at 10:28

## 2022-01-12 RX ADMIN — MAGNESIUM OXIDE 400 MG ORAL TABLET 400 MILLIGRAM(S): 241.3 TABLET ORAL at 18:50

## 2022-01-12 NOTE — CONSULT NOTE ADULT - SUBJECTIVE AND OBJECTIVE BOX
Patient is a 71y old  Male who presents with a chief complaint of NSTEMI, Seizure Disorder, ETOH Abuse      HPI:  70 y/o AA M with PMHx of HTN , MI,  PUD , Alcoholism who was transferred to [facility] from [facility] for Off Loading. Pt was admitted to [facility] on 1/9/2022 after a GTC seizure at home and again in the ED as per the records, he had been non-compliant with his Keppra and other meds due to alcohol use; also reportedly had chest pain, NSTEMI, mild MANUEL, received Keppra loading dose of 1000 mgs and has been getting Keppra 500 mgt bid since. CT brain showed mild diffuse cerebral atrophy, chronic white matter microvascular disease.     Pt is scheduled for nuclear stress test, cardio wanted clearence reg sz before the test          As per history pt reports his Ist seizure 2 years ago, was related to excessive ETOH use, was started on Keppra, f/u with physician in [place] where he lives, he reports he took Keppra regularly for a while but  reduced the dose to once daily since past few months (unclear if he was really complaint). He reports he was home with wife when he had the current seizure at home, does not remember any details, reports he awoke and found himself in the hospital, his last alcohol drink - beer was three days prior.    At present he is fully alert with no compalints      PAST MEDICAL & SURGICAL HISTORY:  Hypertension  Alcoholism  Seizure disorder  Peptic ulcer disease        FAMILY HISTORY:  Family history of alcoholism in mother (Father, Mother)        Social Hx: Current smoker, drinks BEER on regular basis      MEDICATIONS  (STANDING):  cholecalciferol 6000 Unit(s) Oral daily  clopidogrel Tablet 75 milliGRAM(s) Oral daily  enoxaparin Injectable 40 milliGRAM(s) SubCutaneous two times a day  folic acid 1 milliGRAM(s) Oral daily  levETIRAcetam 500 milliGRAM(s) Oral two times a day  LORazepam   Injectable 1 milliGRAM(s) IV Push once  magnesium oxide 400 milliGRAM(s) Oral two times a day with meals  metoprolol tartrate 25 milliGRAM(s) Oral every 12 hours  multivitamin 1 Tablet(s) Oral daily  sodium chloride 0.9%. 1000 milliLiter(s) (75 mL/Hr) IV Continuous <Continuous>  thiamine 100 milliGRAM(s) Oral daily       Allergies  No Known Allergies      ROS: Pertinent positives in HPI, all other ROS were reviewed and are negative.      Vital Signs Last 24 Hrs  T(C): 36.7 (12 Jan 2022 08:52), Max: 36.9 (11 Jan 2022 20:47)  T(F): 98 (12 Jan 2022 08:52), Max: 98.4 (11 Jan 2022 20:47)  HR: 74 (12 Jan 2022 08:52) (71 - 88)  BP: 155/72 (12 Jan 2022 08:52) (149/79 - 155/72)  BP(mean): 99 (11 Jan 2022 18:18) (99 - 99)  RR: 18 (12 Jan 2022 08:52) (18 - 18)  SpO2: 99% (12 Jan 2022 08:52) (95% - 100%)      Gen exam:  Normocephalic, awake and alert.  HEENT: PERRLA, EOMI,   Neck: Supple.  Respiratory: Breath sounds are clear bilaterally  Cardiovascular: S1 and S2, regular   Extremities:  no edema  Vascular: No Carotid Bruit   Musculoskeletal: slight arthritis wrists, no abnormal movements  Skin: No rashes    Neurological exam:  HF: A x O x 3. interactive, normal affect. Speech fluent, No Aphasia or paraphasic errors. Naming /repetition intact   CN: ERNESTO, EOMI, VFF, facial sensation normal, no NLFD, tongue midline, Palate moves equally, SCM equal bilaterally  Motor: No pronator drift, Strength 5/5 in all 4 ext, normal bulk and tone, no tremor    Sens: Intact to light touch     Reflexes: Symmetric, hypoactive, downgoing toes b/l  Coord:  No FNFA, dysmetria   Gait/Balance: not tested      Labs:   01-11    137  |  107  |  16  ----------------------------<  94  4.0   |  24  |  1.32<H>    Ca    8.5      11 Jan 2022 11:15    TPro  7.3  /  Alb  2.9<L>  /  TBili  0.3  /  DBili  x   /  AST  47<H>  /  ALT  32  /  AlkPhos  95  01-11 01-11 Chol 172 LDL -- HDL 92 Trig 104                          8.4    9.84  )-----------( 342      ( 11 Jan 2022 11:15 )             29.1         
PCP:        CHIEF COMPLAINT: had seizure at home     HPI:  Pt is a pleasant 72 y/o AA Male with PMHX: HTN , MI, CAD PCI , PAD stent RLE   PUD , Alcoholism who was transferred to [facility] from [facility] for Off Loading.  He was admitted to [facility] on 2022 after a Generalized Tonic Clonic seizure at home and again in the ED.    Pt had been non-compliant with his Keppra and other medications due to his alcohol use.   At [facility] , pt reportedly had chest pain,  an NSTEMI, mild MANUEL and concern for possible Rhabdomyolysis.   He was noted to have anemia H/H 10/35, MCV 79, WBC 13.8  In the ED he received Keppra loading dose of 1000 mg,  NS of 1L bolus.   Pt was AAOx3,  and had a CIWA score of 0.  Pt had a troponin increase from 293 to 2193, and  to 1,368.     He was started on plavix and ASA , then continued with plavix 75mg and lovenox 50 mg .    Initial EKG:  NSR 70 bpm om 2022 then NSR 65 bpm on 1/10/2022.    Repeat labs also reveal Vit D, 25 hydroxy  13.0,  TSH 0.395,  F T4 1.35, Bun 17.8, Cr 1.33,  GFR 61.0.  Cxray showed probably basilar atelectasis,  CT brain:  mild diffuse cerebral atrophy, chronic white matter microvascular disease.         Pt has no complaints at this time and reports he last drank alcohol three days ago, a can of beer.  He denies current chest pain, no abd pain, no calf pain/swelling.  No fever/chills, no respiratory, urinary or abdominal symptoms.        Pt is covid neg and has been vaccinated.  Patient does not remember whether he had chest pain prior to event , as per patient was able to walk around without chest pain ,has claudication on walking certain distance , patient gives hx of CAD stent 3 years ago , patient does not see cardiologist ,     Patient denies any chest pain after seizure resolved , currently feeling , his ekg did not show any significant ST T changes ,       PAST MEDICAL & SURGICAL HISTORY:  Hypertension    Alcoholism    Seizure disorder    PUD (peptic ulcer disease)    Peptic ulcer disease        Allergies    No Known Allergies    Intolerances        SOCIAL HISTORY: active smoker , alcholic  ( beer drinker )     FAMILY HISTORY:  Family history of alcoholism in mother (Father, Mother)        MEDICATIONS:Home Medications:  folic acid 1 mg oral tablet: 1 tab(s) orally once a day (:24)  Keppra 500 mg oral tablet: 1 tab(s) orally 2 times a day (:24)  Lovenox: 50 unit(s) injectable once a day (:24)  magnesium oxide 400 mg oral tablet: 1 tab(s) orally 2 times a day ()  metoprolol tartrate 25 mg oral tablet: 1 tab(s) orally 2 times a day (:24)  Multiple Vitamins oral tablet: 1 tab(s) orally once a day (:)  Plavix 75 mg oral tablet: 1 tab(s) orally once a day ()  thiamine 100 mg oral tablet: 1 tab(s) orally once a day (:24)    MEDICATIONS  (STANDING):  cholecalciferol 6000 Unit(s) Oral daily  clopidogrel Tablet 75 milliGRAM(s) Oral daily  enoxaparin Injectable 40 milliGRAM(s) SubCutaneous two times a day  folic acid 1 milliGRAM(s) Oral daily  levETIRAcetam 500 milliGRAM(s) Oral two times a day  LORazepam   Injectable 1 milliGRAM(s) IV Push once  magnesium oxide 400 milliGRAM(s) Oral two times a day with meals  metoprolol tartrate 25 milliGRAM(s) Oral every 12 hours  multivitamin 1 Tablet(s) Oral daily  sodium chloride 0.9%. 1000 milliLiter(s) (75 mL/Hr) IV Continuous <Continuous>  thiamine 100 milliGRAM(s) Oral daily    MEDICATIONS  (PRN):  LORazepam     Tablet 1 milliGRAM(s) Oral every 2 hours PRN CIWA-Ar score increase by 2 points and a total score of 7 or less  LORazepam     Tablet 1 milliGRAM(s) Oral every 1 hour PRN CIWA-Ar score 8 or greater  LORazepam     Tablet 2 milliGRAM(s) Oral every 2 hours PRN Symptom-triggered: 2 point increase in CIWA -Ar score and a total score of 7 or LESS      REVIEW OF SYSTEMS:    CONSTITUTIONAL: No weakness, fevers or chills  EYES/ENT: No visual changes;  No vertigo or throat pain   NECK: No pain or stiffness  RESPIRATORY: No cough, wheezing, hemoptysis; No shortness of breath  CARDIOVASCULAR: No chest pain or palpitations  positive RLE claudication   GASTROINTESTINAL: No abdominal or epigastric pain. No nausea, vomiting, or hematemesis; No diarrhea or constipation. No melena or hematochezia.  GENITOURINARY: No dysuria, frequency or hematuria  NEUROLOGICAL: No numbness or weakness  SKIN: No itching, burning, rashes, or lesions   All other review of systems is negative unless indicated above    Vital Signs Last 24 Hrs  T(C): 36.6 (2022 09:04), Max: 37 (2022 05:48)  T(F): 97.9 (2022 09:04), Max: 98.6 (2022 05:48)  HR: 69 (2022 09:04) (60 - 69)  BP: 135/65 (2022 09:04) (135/65 - 149/92)  BP(mean): --  RR: 17 (2022 09:04) (17 - 18)  SpO2: 95% (2022 09:04) (95% - 99%)    I&O's Summary    10 Luis 2022 07:01  -  2022 07:00  --------------------------------------------------------  IN: 600 mL / OUT: 0 mL / NET: 600 mL        PHYSICAL EXAM:    Constitutional: NAD, awake and alert, well-developed  HEENT: PERR, EOMI,  No oral cyananosis.  Neck:  supple,  No JVD  Respiratory: Breath sounds are clear bilaterally, No wheezing, rales or rhonchi  Cardiovascular: S1 and S2, regular rate and rhythm, no Murmurs, gallops or rubs  Gastrointestinal: Bowel Sounds present, soft, nontender.   Extremities: No peripheral edema. No clubbing or cyanosis.  Vascular: 1+ peripheral pulses  very feeble right pT DP   Neurological: A/O x 3, no focal deficits  Musculoskeletal: no calf tenderness.  Skin: No rashes.      LABS: All Labs Reviewed:      Troponin I, High Sensitivity Result: 1351.10:       CARDIAC MARKERS ( 2022 00:19 )  x     / x     / 1209 U/L / x     / x            Blood Culture:         RADIOLOGY/EK22  normal sinus rhythm no significant ST T changes

## 2022-01-12 NOTE — PROGRESS NOTE ADULT - SUBJECTIVE AND OBJECTIVE BOX
PCP:    REQUESTING PHYSICIAN:    REASON FOR CONSULT:    CHIEF COMPLAINT:    HPI:  Pt is a pleasant 70 y/o AA Male with PMHX: HTN , MI, CAD PCI , PAD stent RLE   PUD , Alcoholism who was transferred to [facility] from [facility] for Off Loading.  He was admitted to [facility] on 1/9/2022 after a Generalized Tonic Clonic seizure at home and again in the ED.    Pt had been non-compliant with his Keppra and other medications due to his alcohol use.   At [facility] , pt reportedly had chest pain,  an NSTEMI, mild MANUEL and concern for possible Rhabdomyolysis.   He was noted to have anemia H/H 10/35, MCV 79, WBC 13.8  In the ED he received Keppra loading dose of 1000 mg,  NS of 1L bolus.   Pt was AAOx3,  and had a CIWA score of 0.  Pt had a troponin increase from 293 to 2193, and  to 1,368.     He was started on plavix and ASA , then continued with plavix 75mg and lovenox 50 mg .    Initial EKG:  NSR 70 bpm om 1/9/2022 then NSR 65 bpm on 1/10/2022.    Repeat labs also reveal Vit D, 25 hydroxy  13.0,  TSH 0.395,  F T4 1.35, Bun 17.8, Cr 1.33,  GFR 61.0.  Cxray showed probably basilar atelectasis,  CT brain:  mild diffuse cerebral atrophy, chronic white matter microvascular disease.         Pt has no complaints at this time and reports he last drank alcohol three days ago, a can of beer.  He denies current chest pain, no abd pain, no calf pain/swelling.  No fever/chills, no respiratory, urinary or abdominal symptoms.        Pt is covid neg and has been vaccinated.  Patient does not remember whether he had chest pain prior to event , as per patient was able to walk around without chest pain ,has claudication on walking certain distance , patient gives hx of CAD stent 3 years ago , patient does not see cardiologist ,     Patient denies any chest pain after seizure resolved , currently feeling , his ekg did not show any significant ST T changes ,   1/12/22: Pt comfortable and eager to go home. ETT planned tomorrow    PAST MEDICAL & SURGICAL HISTORY:  Hypertension    Alcoholism    Seizure disorder    PUD (peptic ulcer disease)    Peptic ulcer disease        Allergies    No Known Allergies    Intolerances        SOCIAL HISTORY:    FAMILY HISTORY:  Family history of alcoholism in mother (Father, Mother)        MEDICATIONS:  MEDICATIONS  (STANDING):  cholecalciferol 6000 Unit(s) Oral daily  clopidogrel Tablet 75 milliGRAM(s) Oral daily  enoxaparin Injectable 40 milliGRAM(s) SubCutaneous two times a day  folic acid 1 milliGRAM(s) Oral daily  levETIRAcetam 500 milliGRAM(s) Oral two times a day  LORazepam   Injectable 1 milliGRAM(s) IV Push once  magnesium oxide 400 milliGRAM(s) Oral two times a day with meals  metoprolol tartrate 25 milliGRAM(s) Oral every 12 hours  multivitamin 1 Tablet(s) Oral daily  sodium chloride 0.9%. 1000 milliLiter(s) (100 mL/Hr) IV Continuous <Continuous>  thiamine 100 milliGRAM(s) Oral daily    MEDICATIONS  (PRN):  LORazepam     Tablet 1 milliGRAM(s) Oral every 2 hours PRN CIWA-Ar score increase by 2 points and a total score of 7 or less  LORazepam     Tablet 1 milliGRAM(s) Oral every 1 hour PRN CIWA-Ar score 8 or greater  LORazepam     Tablet 2 milliGRAM(s) Oral every 2 hours PRN Symptom-triggered: 2 point increase in CIWA -Ar score and a total score of 7 or LESS        Vital Signs Last 24 Hrs  T(C): 37.2 (12 Jan 2022 17:16), Max: 37.2 (12 Jan 2022 17:16)  T(F): 99 (12 Jan 2022 17:16), Max: 99 (12 Jan 2022 17:16)  HR: 78 (12 Jan 2022 17:16) (71 - 88)  BP: 152/73 (12 Jan 2022 17:16) (149/79 - 155/72)  BP(mean): 96 (12 Jan 2022 17:16) (96 - 99)  RR: 18 (12 Jan 2022 17:16) (18 - 18)  SpO2: 96% (12 Jan 2022 17:16) (95% - 100%)    I&O's Summary    11 Jan 2022 07:01  -  12 Jan 2022 07:00  --------------------------------------------------------  IN: 0 mL / OUT: 300 mL / NET: -300 mL    12 Jan 2022 07:01  -  12 Jan 2022 17:57  --------------------------------------------------------  IN: 225 mL / OUT: 100 mL / NET: 125 mL        PHYSICAL EXAM:    Constitutional: NAD, awake and alert, well-developed  HEENT: PERR, EOMI,  No oral cyananosis.  Neck:  supple,  No JVD  Respiratory: Breath sounds are clear bilaterally, No wheezing, rales or rhonchi  Cardiovascular: S1 and S2, regular rate and rhythm, no Murmurs, gallops or rubs  Gastrointestinal: Bowel Sounds present, soft, nontender.   Extremities: No peripheral edema. No clubbing or cyanosis.  Vascular: 2+ peripheral pulses  Neurological: A/O x 3, no focal deficits  Musculoskeletal: no calf tenderness.  Skin: No rashes.      LABS: All Labs Reviewed:                        8.4    13.57 )-----------( 317      ( 12 Jan 2022 11:09 )             28.1                         8.4    9.84  )-----------( 342      ( 11 Jan 2022 11:15 )             29.1     12 Jan 2022 11:09    136    |  106    |  15     ----------------------------<  93     3.7     |  23     |  1.35   11 Jan 2022 11:15    137    |  107    |  16     ----------------------------<  94     4.0     |  24     |  1.32     Ca    9.0        12 Jan 2022 11:09  Ca    8.5        11 Jan 2022 11:15    TPro  7.1    /  Alb  2.8    /  TBili  0.3    /  DBili  x      /  AST  49     /  ALT  34     /  AlkPhos  96     12 Jan 2022 11:09  TPro  7.3    /  Alb  2.9    /  TBili  0.3    /  DBili  x      /  AST  47     /  ALT  32     /  AlkPhos  95     11 Jan 2022 11:15      CARDIAC MARKERS ( 12 Jan 2022 11:09 )  x     / x     / 2079 U/L / x     / x      CARDIAC MARKERS ( 11 Jan 2022 00:19 )  x     / x     / 1209 U/L / x     / x          Blood Culture:         RADIOLOGY/EKG:< from: 12 Lead ECG (01.12.22 @ 07:16) >  Diagnosis Line Normal sinus rhythm  Normal ECG  When compared with ECG of 11-JAN-2022 08:54,  No significant change was found  Confirmed by Morris Vera ([id number]) on 1/12/2022 3:48:47 PM    < end of copied text >    < from: TTE Echo Complete w/o Contrast w/ Doppler (01.11.22 @ 12:53) >  ummary     The left ventricle is normal in size, wall thickness, wall motion and   contractility.   Estimated left ventricular ejection fraction is 60-65 %.   Normal appearing left atrium.   The aortic valve is well visualized, appears mildly calcified. Valve   opening seems to be normal.   Fibrocalcific changes noted to the mitral valve leaflets with preserved   leaflet excursion.   EA reversal of the mitral inflow consistent with reduced compliance of   the   left ventricle.   Trace mitral regurgitation is present.   Normal appearing tricuspid valve structure.   Trace tricuspid valve regurgitation is present.   Normal appearing pulmonic valve structure.   Trace pulmonic valvular regurgitation is present.     Signature    ----------------------------------------------------------------   Electronically signed by Kelton Adam MD(Interpreting   physician) on 01/11/2022 05:39 PM   ----------------------------------------------------------------    < end of copied text >

## 2022-01-12 NOTE — PROGRESS NOTE ADULT - ASSESSMENT
# NSTEMI Elevated Troponin  - CAD (coronary artery disease).   ·  Recommendation: prior hx of PCI 2 to3 years no details available , advised the patient to quit smoking , continue statin plavix  follow up echo ,  -direct admit,   admitted to telemetry  - cont  ml/hr  - follow CPK and troponins, renal function  - cont lovenox 50 units,plavix 75mg,  metoprolol  - echo and cardiology consult, check fasting lipids  - add statin if LFTS wnl  - Consulted     # Nicotine dependance   -advised the patient to quit smoking    # Peripheral arterial disease.    with intermittent claudication of RLE with prior hx of arterial stent ,   continue plavix , encourage to quit smoking.    # Seizure Disorder  -PAtient restarted on Keppra   - - cont Keppra 500mg po BID  - seizure precautions    # ETOH Abuse  - on CIWA protocol   - cont thiamine, folate, MVI,  Mag, Vit D    #VIt D defic  -c/w Vitamine Vitamin D3  - DVT proph: pt on lovenox  - Adv Dir: Full Code [General Appearance - Well Developed] : well developed [General Appearance - Well Nourished] : well nourished [Normal Appearance] : normal appearance # NSTEMI Elevated Troponin  - CAD (coronary artery disease).   ·  Recommendation: prior hx of PCI 2 to3 years no details available , advised the patient to quit smoking , continue statin plavix  follow up echo ,  -direct admit,   admitted to telemetry  - cont  ml/hr  - follow CPK and troponins, renal function  - cont lovenox 50 units,plavix 75mg,  metoprolol  - echo and cardiology consult, check fasting lipids  - add statin if LFTS wnl  - Consulted     # Rhabdomyolysis  trend CPK  - Cw IV fluids     # Nicotine dependance   -advised the patient to quit smoking    # Peripheral arterial disease.    with intermittent claudication of RLE with prior hx of arterial stent ,   continue plavix , encourage to quit smoking.    # Seizure Disorder  -PAtient restarted on Keppra   - - cont Keppra 500mg po BID  - seizure precautions    # ETOH Abuse  - on CIWA protocol   - cont thiamine, folate, MVI,  Mag, Vit D    #VIt D defic  -c/w Vitamine Vitamin D3  - DVT proph: pt on lovenox  - Adv Dir: Full Code [Well Groomed] : well groomed # NSTEMI Elevated Troponin  - CAD (coronary artery disease).   ·  Recommendation: prior hx of PCI 2 to3 years no details available , advised the patient to quit smoking , continue statin plavix  follow up echo ,  -direct admit,   admitted to telemetry  - cont  ml/hr  - follow CPK and troponins, renal function  - cont lovenox 50 units,plavix 75mg,  metoprolol  - echo as above no Abnormal wall  motion   - Cardiology consult, check fasting lipids  - add statin if LFTS wnl    # Rhabdomyolysis  trend CPK` 1209->2079   - Cw IV fluids     # Nicotine dependance   -advised the patient to quit smoking    # Peripheral arterial disease.    with intermittent claudication of RLE with prior hx of arterial stent ,   continue plavix , encourage to quit smoking.    # Seizure Disorder  Patient restarted on Keppra   - - cont Keppra 500mg po BID  - seizure precautions    # ETOH Abuse  - on CIWA protocol   - cont thiamine, folate, MVI,  Mag, Vit D    #VIt D defic  -c/w Vitamine Vitamin D3  - DVT proph: pt on lovenox  - Adv Dir: Full Code [General Appearance - In No Acute Distress] : no acute distress [Urethral Meatus] : the meatus of the urethra showed no abnormalities # NSTEMI Elevated Troponin  - CAD (coronary artery disease).   ·  Recommendation: prior hx of PCI 2 to3 years no details available , advised the patient to quit smoking , continue statin plavix  follow up echo ,  -direct admit,   admitted to telemetry  - cont  ml/hr  - follow CPK and troponins, renal function  - cont lovenox 50 units,plavix 75mg,  metoprolol  - echo as above no Abnormal wall  motion   - Cardiology consult, check fasting lipids  - add statin if LFTS wnl    # Rhabdomyolysis  trend CPK` 1209->2079   - Cw IV fluids     # Nicotine dependance   -advised the patient to quit smoking    # Peripheral arterial disease.    with intermittent claudication of RLE with prior hx of arterial stent ,   continue plavix , encourage to quit smoking.    # Seizure Disorder  Patient restarted on Keppra   - cont Keppra 500mg po BID  - seizure precautions    # ETOH Abuse  - on CIWA protocol   - cont thiamine, folate, MVI,  Mag, Vit D    #VIt D defic  -c/w Vitamine Vitamin D3  - DVT proph: pt on lovenox  - Adv Dir: Full Code [] : no respiratory distress # NSTEMI Elevated Troponin  - CAD (coronary artery disease).   ·  Recommendation: prior hx of PCI 2 to3 years no details available , advised the patient to quit smoking , continue statin plavix  follow up echo ,  -direct admit,   admitted to telemetry  - cont  ml/hr  - follow CPK and troponins, renal function  - cont lovenox 50 units,plavix 75mg,  metoprolol  - echo as above no Abnormal wall  motion   - Cardiology consult, check fasting lipids  - add statin if LFTS wnl  01/12 Stress likely tomorrow     # Rhabdomyolysis  trend CPK` 1209->2079   - Cw IV fluids     # Nicotine dependance   -advised the patient to quit smoking    # Peripheral arterial disease.    with intermittent claudication of RLE with prior hx of arterial stent ,   continue plavix , encourage to quit smoking.    # Seizure Disorder  Patient restarted on Keppra   - cont Keppra 500mg po BID  - seizure precautions    # ETOH Abuse  - on CIWA protocol   - cont thiamine, folate, MVI,  Mag, Vit D    #VIt D defic  -c/w Vitamine Vitamin D3  - DVT proph: pt on lovenox  - Adv Dir: Full Code [Respiration, Rhythm And Depth] : normal respiratory rhythm and effort [Oriented To Time, Place, And Person] : oriented to person, place, and time [Affect] : the affect was normal [Mood] : the mood was normal [Not Anxious] : not anxious [Normal Station and Gait] : the gait and station were normal for the patient's age

## 2022-01-12 NOTE — [facility] NOTE ADULT - NSSBIRTBRIEFINTDET_GEN_A_CORE
Pt reported that he typically drinks 2 beers every other day.  He said he started drinking when he was a teenager, but now has been advised to quit for health reasons. Pt does not want a referral for tx at this time. Advised him he can call [facility] in the future if he needs assistance with a referral (provided flyer with contact information).

## 2022-01-12 NOTE — CONSULT NOTE ADULT - ASSESSMENT
72 y/o AA M with PMHx of HTN , MI,  PUD , Alcoholism transferred to  from [facility] for Off Loading, was admitted to [facility] on 1/9/2022 after a GTC seizure at home. Pt has NSTEMI, mild MANUEL, is scheduled for nuclear stress test, cardio wanted clearence reg sz before the test.         As per history pt reports his Ist seizure 2 years ago, was related to excessive ETOH use, was started on Keppra, f/u with physician in [place] where he lives, he reports he took Keppra regularly for a while but  reduced the dose to once daily since past few months (unclear if he was really complaint), his last alcohol drink - beer was three days prior. He has been loaded with Keppra and has remained seizure free - exam is non-focal.    # Break thro seizure, possible non-complaince vs ETOH withdrawal.     - There is no C/I to nuclear stress test at this time from seizure perspective   - Recommend continue Keppra 500 mg bid  - EEG when possible (currently not available)    Above D/W Dr. Reynaga and patient

## 2022-01-12 NOTE — PROGRESS NOTE ADULT - SUBJECTIVE AND OBJECTIVE BOX
Subjective:  Patient is a 71y old  Male who presents with a chief complaint of NSTEMI  Elevated Troponin  Seizure Disorder  Possible Rhabdomyolysis  ETOH Abuse (11 Jan 2022 14:42)    HPI:  Pt is a pleasant 72 y/o AA Male with PMHX: HTN , MI,  PUD , Alcoholism who was transferred to [facility] from [facility] for Off Loading.  He was admitted to [facility] on 1/9/2022 after a Generalized Tonic Clonic seizure at home and again in the ED.    Pt had been non-compliant with his Keppra and other medications due to his alcohol use.   At [facility] , pt reportedly had chest pain,  an NSTEMI, mild MANUEL and concern for possible Rhabdomyolysis.   He was noted to have anemia H/H 10/35, MCV 79, WBC 13.8  In the ED he received Keppra loading dose of 1000 mg,  NS of 1L bolus.   Pt was AAOx3,  and had a CIWA score of 0.  Pt had a troponin increase from 293 to 2193, and  to 1,368.     He was started on plavix and ASA , then continued with plavix 75mg and lovenox 50 mg .    Initial EKG:  NSR 70 bpm om 1/9/2022 then NSR 65 bpm on 1/10/2022.    Repeat labs also reveal Vit D, 25 hydroxy  13.0,  TSH 0.395,  F T4 1.35, Bun 17.8, Cr 1.33,  GFR 61.0.  Cxray showed probably basilar atelectasis,  CT brain:  mild diffuse cerebral atrophy, chronic white matter microvascular disease.         Pt has no complaints at this time and reports he last drank alcohol three days ago, a can of beer.  He denies current chest pain, no abd pain, no calf pain/swelling.  No fever/chills, no respiratory, urinary or abdominal symptoms.        Pt is covid neg and has been vaccinated.  (10 Luis 2022 23:56)    Patient seen and examined at bedside earlier today,   Feeling better , no chest pain     OBJECTIVE:   Vitals reviewed  PHYSICAL EXAM:  GENERAL: NAD  NERVOUS SYSTEM:  Alert & Oriented X3, non- focal exam,  Motor Strength 5/5 B/L upper and lower extremities; DTRs 2+ intact and symmetric  HEAD:  Atraumatic, Normocephalic  EYES: EOMI, PERRLA, conjunctiva and sclera clear  HEENT: Moist mucous membranes  NECK: Supple, No JVD  CHEST/LUNG: Clear to auscultation bilaterally; No rales, no rhonchi, no wheezing  HEART: Regular rate and rhythm; No murmurs, no rubs or gallops  ABDOMEN: Soft, Nontender, Nondistended; Bowel sounds present  GENITOURINARY- Voiding, no suprapubic tenderness  EXTREMITIES:  2+ Peripheral Pulses, No clubbing, cyanosis,   edema  MUSCULOSKELETAL:- No muscle tenderness, Muscle tone normal, No joint tenderness, no Joint swelling,  Joint ROM -normal  SKIN-no rash, no lesion    LABS: all reviewed                         8.4    9.84  )-----------( 342      ( 11 Jan 2022 11:15 )             29.1     01-11    137  |  107  |  16  ----------------------------<  94  4.0   |  24  |  1.32<H>    Ca    8.5      11 Jan 2022 11:15    TPro  7.3  /  Alb  2.9<L>  /  TBili  0.3  /  DBili  x   /  AST  47<H>  /  ALT  32  /  AlkPhos  95  01-11      CARDIAC MARKERS ( 11 Jan 2022 00:19 )  x     / x     / 1209 U/L / x     / x              CAPILLARY BLOOD GLUCOSE            RECENT CULTURES:    RADIOLOGY & ADDITIONAL TESTS: all reviewed   EKG reviewed        Current medications:  cholecalciferol 6000 Unit(s) Oral daily  clopidogrel Tablet 75 milliGRAM(s) Oral daily  enoxaparin Injectable 40 milliGRAM(s) SubCutaneous two times a day  folic acid 1 milliGRAM(s) Oral daily  levETIRAcetam 500 milliGRAM(s) Oral two times a day  LORazepam     Tablet 1 milliGRAM(s) Oral every 2 hours PRN  LORazepam     Tablet 1 milliGRAM(s) Oral every 1 hour PRN  LORazepam     Tablet 2 milliGRAM(s) Oral every 2 hours PRN  LORazepam   Injectable 1 milliGRAM(s) IV Push once  magnesium oxide 400 milliGRAM(s) Oral two times a day with meals  metoprolol tartrate 25 milliGRAM(s) Oral every 12 hours  multivitamin 1 Tablet(s) Oral daily  sodium chloride 0.9%. 1000 milliLiter(s) IV Continuous <Continuous>  thiamine 100 milliGRAM(s) Oral daily             Subjective:  Patient seen and examined at bedside earlier today,   Feeling better , no chest pain     OBJECTIVE:   T(C): 36.7 (01-12-22 @ 08:52), Max: 36.9 (01-11-22 @ 20:47)  T(F): 98 (01-12-22 @ 08:52), Max: 98.4 (01-11-22 @ 20:47)  HR: 74 (01-12-22 @ 08:52) (71 - 88)  BP: 155/72 (01-12-22 @ 08:52) (149/79 - 155/72)  RR: 18 (01-12-22 @ 08:52) (18 - 18)  SpO2: 99% (01-12-22 @ 08:52) (95% - 100%)  Wt(kg): --  Vitals reviewed  PHYSICAL EXAM:  GENERAL: NAD  NERVOUS SYSTEM:  Alert & Oriented X3, non- focal exam,  Motor Strength 5/5 B/L upper and lower extremities; DTRs 2+ intact and symmetric  HEAD:  Atraumatic, Normocephalic  EYES: EOMI, PERRLA, conjunctiva and sclera clear  HEENT: Moist mucous membranes  NECK: Supple, No JVD  CHEST/LUNG: Clear to auscultation bilaterally; No rales, no rhonchi, no wheezing  HEART: Regular rate and rhythm; No murmurs, no rubs or gallops  ABDOMEN: Soft, Nontender, Nondistended; Bowel sounds present  GENITOURINARY- Voiding, no suprapubic tenderness  EXTREMITIES:  2+ Peripheral Pulses, No clubbing, cyanosis,   edema  MUSCULOSKELETAL:- No muscle tenderness, Muscle tone normal, No joint tenderness, no Joint swelling,  Joint ROM -normal  SKIN-no rash, no lesion    LABS: all reviewed                         8.4    9.84  )-----------( 342      ( 11 Jan 2022 11:15 )             29.1     01-11    137  |  107  |  16  ----------------------------<  94  4.0   |  24  |  1.32<H>    Ca    8.5      11 Jan 2022 11:15    TPro  7.3  /  Alb  2.9<L>  /  TBili  0.3  /  DBili  x   /  AST  47<H>  /  ALT  32  /  AlkPhos  95  01-11      CARDIAC MARKERS ( 11 Jan 2022 00:19 )  x     / x     / 1209 U/L / x     / x              CAPILLARY BLOOD GLUCOSE            RECENT CULTURES:    RADIOLOGY & ADDITIONAL TESTS: all reviewed   EKG reviewed        Current medications:  cholecalciferol 6000 Unit(s) Oral daily  clopidogrel Tablet 75 milliGRAM(s) Oral daily  enoxaparin Injectable 40 milliGRAM(s) SubCutaneous two times a day  folic acid 1 milliGRAM(s) Oral daily  levETIRAcetam 500 milliGRAM(s) Oral two times a day  LORazepam     Tablet 1 milliGRAM(s) Oral every 2 hours PRN  LORazepam     Tablet 1 milliGRAM(s) Oral every 1 hour PRN  LORazepam     Tablet 2 milliGRAM(s) Oral every 2 hours PRN  LORazepam   Injectable 1 milliGRAM(s) IV Push once  magnesium oxide 400 milliGRAM(s) Oral two times a day with meals  metoprolol tartrate 25 milliGRAM(s) Oral every 12 hours  multivitamin 1 Tablet(s) Oral daily  sodium chloride 0.9%. 1000 milliLiter(s) IV Continuous <Continuous>  thiamine 100 milliGRAM(s) Oral daily             Subjective:  Patient seen and examined at bedside earlier today,   Feeling better , no chest pain     OBJECTIVE:   T(C): 36.7 (01-12-22 @ 08:52), Max: 36.9 (01-11-22 @ 20:47)  T(F): 98 (01-12-22 @ 08:52), Max: 98.4 (01-11-22 @ 20:47)  HR: 74 (01-12-22 @ 08:52) (71 - 88)  BP: 155/72 (01-12-22 @ 08:52) (149/79 - 155/72)  RR: 18 (01-12-22 @ 08:52) (18 - 18)  SpO2: 99% (01-12-22 @ 08:52) (95% - 100%)  Wt(kg): --  Vitals reviewed  PHYSICAL EXAM:  GENERAL: NAD  NERVOUS SYSTEM:  Alert & Oriented X3, non- focal exam,  Motor Strength 5/5 B/L upper and lower extremities; DTRs 2+ intact and symmetric  HEAD:  Atraumatic, Normocephalic  EYES: EOMI, PERRLA, conjunctiva and sclera clear  HEENT: Moist mucous membranes  NECK: Supple, No JVD  CHEST/LUNG: Clear to auscultation bilaterally; No rales, no rhonchi, no wheezing  HEART: Regular rate and rhythm; No murmurs, no rubs or gallops  ABDOMEN: Soft, Nontender, Nondistended; Bowel sounds present  GENITOURINARY- Voiding, no suprapubic tenderness  EXTREMITIES:  2+ Peripheral Pulses, No clubbing, cyanosis,   edema  MUSCULOSKELETAL:- No muscle tenderness, Muscle tone normal, No joint tenderness, no Joint swelling,  Joint ROM -normal  SKIN-no rash, no lesion    01-12    136  |  106  |  15  ----------------------------<  93  3.7   |  23  |  1.35<H>    Ca    9.0      12 Jan 2022 11:09    TPro  7.1  /  Alb  2.8<L>  /  TBili  0.3  /  DBili  x   /  AST  49<H>  /  ALT  34  /  AlkPhos  96  01-12                            8.4    13.57 )-----------( 317      ( 12 Jan 2022 11:09 )             28.1       CARDIAC MARKERS ( 12 Jan 2022 11:09 )  x     / x     / 2079 U/L / x     / x      CARDIAC MARKERS ( 11 Jan 2022 00:19 )  x     / x     / 1209 U/L / x     / x            LIVER FUNCTIONS - ( 12 Jan 2022 11:09 )  Alb: 2.8 g/dL / Pro: 7.1 gm/dL / ALK PHOS: 96 U/L / ALT: 34 U/L / AST: 49 U/L / GGT: x                       RECENT CULTURES:    RADIOLOGY & ADDITIONAL TESTS: all reviewed   EKG reviewed      MEDICATIONS  (STANDING):  cholecalciferol 6000 Unit(s) Oral daily  clopidogrel Tablet 75 milliGRAM(s) Oral daily  enoxaparin Injectable 40 milliGRAM(s) SubCutaneous two times a day  folic acid 1 milliGRAM(s) Oral daily  levETIRAcetam 500 milliGRAM(s) Oral two times a day  LORazepam   Injectable 1 milliGRAM(s) IV Push once  magnesium oxide 400 milliGRAM(s) Oral two times a day with meals  metoprolol tartrate 25 milliGRAM(s) Oral every 12 hours  multivitamin 1 Tablet(s) Oral daily  sodium chloride 0.9%. 1000 milliLiter(s) (100 mL/Hr) IV Continuous <Continuous>  thiamine 100 milliGRAM(s) Oral daily    MEDICATIONS  (PRN):  LORazepam     Tablet 1 milliGRAM(s) Oral every 2 hours PRN CIWA-Ar score increase by 2 points and a total score of 7 or less  LORazepam     Tablet 1 milliGRAM(s) Oral every 1 hour PRN CIWA-Ar score 8 or greater  LORazepam     Tablet 2 milliGRAM(s) Oral every 2 hours PRN Symptom-triggered: 2 point increase in CIWA -Ar score and a total score of 7 or LESS    Summary     The left ventricle is normal in size, wall thickness, wall motion and   contractility.   Estimated left ventricular ejection fraction is 60-65 %.   Normal appearing left atrium.   The aortic valve is well visualized, appears mildly calcified. Valve   opening seems to be normal.   Fibrocalcific changes noted to the mitral valve leaflets with preserved   leaflet excursion.   EA reversal of the mitral inflow consistent with reduced compliance of   the   left ventricle.   Trace mitral regurgitation is present.   Normal appearing tricuspid valve structure.   Trace tricuspid valve regurgitation is present.   Normal appearing pulmonic valve structure.   Trace pulmonic valvular regurgitation is present.

## 2022-01-12 NOTE — CONSULT NOTE ADULT - REASON FOR ADMISSION
NSTEMI  Elevated Troponin  Seizure Disorder  Possible Rhabdomyolysis  ETOH Abuse
NSTEMI  Elevated Troponin  Seizure Disorder  Possible Rhabdomyolysis  ETOH Abuse

## 2022-01-13 VITALS
OXYGEN SATURATION: 99 % | SYSTOLIC BLOOD PRESSURE: 157 MMHG | HEART RATE: 63 BPM | DIASTOLIC BLOOD PRESSURE: 71 MMHG | RESPIRATION RATE: 16 BRPM | TEMPERATURE: 98 F

## 2022-01-13 LAB
ADD ON TEST-SPECIMEN IN LAB: SIGNIFICANT CHANGE UP
ALBUMIN SERPL ELPH-MCNC: 3 G/DL — LOW (ref 3.3–5)
ALP SERPL-CCNC: 103 U/L — SIGNIFICANT CHANGE UP (ref 40–120)
ALT FLD-CCNC: 35 U/L — SIGNIFICANT CHANGE UP (ref 12–78)
ANION GAP SERPL CALC-SCNC: 6 MMOL/L — SIGNIFICANT CHANGE UP (ref 5–17)
ANISOCYTOSIS BLD QL: SLIGHT — SIGNIFICANT CHANGE UP
AST SERPL-CCNC: 47 U/L — HIGH (ref 15–37)
BASOPHILS # BLD AUTO: 0.04 K/UL — SIGNIFICANT CHANGE UP (ref 0–0.2)
BASOPHILS NFR BLD AUTO: 0.4 % — SIGNIFICANT CHANGE UP (ref 0–2)
BILIRUB SERPL-MCNC: 0.3 MG/DL — SIGNIFICANT CHANGE UP (ref 0.2–1.2)
BUN SERPL-MCNC: 14 MG/DL — SIGNIFICANT CHANGE UP (ref 7–23)
CALCIUM SERPL-MCNC: 9.5 MG/DL — SIGNIFICANT CHANGE UP (ref 8.5–10.1)
CHLORIDE SERPL-SCNC: 109 MMOL/L — HIGH (ref 96–108)
CK SERPL-CCNC: 1478 U/L — HIGH (ref 26–308)
CK SERPL-CCNC: 1577 U/L — HIGH (ref 26–308)
CO2 SERPL-SCNC: 25 MMOL/L — SIGNIFICANT CHANGE UP (ref 22–31)
CREAT SERPL-MCNC: 1.35 MG/DL — HIGH (ref 0.5–1.3)
EOSINOPHIL # BLD AUTO: 0.15 K/UL — SIGNIFICANT CHANGE UP (ref 0–0.5)
EOSINOPHIL NFR BLD AUTO: 1.3 % — SIGNIFICANT CHANGE UP (ref 0–6)
GLUCOSE SERPL-MCNC: 99 MG/DL — SIGNIFICANT CHANGE UP (ref 70–99)
HCT VFR BLD CALC: 31.7 % — LOW (ref 39–50)
HGB BLD-MCNC: 9.3 G/DL — LOW (ref 13–17)
HYPOCHROMIA BLD QL: SLIGHT — SIGNIFICANT CHANGE UP
IMM GRANULOCYTES NFR BLD AUTO: 0.3 % — SIGNIFICANT CHANGE UP (ref 0–1.5)
LYMPHOCYTES # BLD AUTO: 1.81 K/UL — SIGNIFICANT CHANGE UP (ref 1–3.3)
LYMPHOCYTES # BLD AUTO: 16 % — SIGNIFICANT CHANGE UP (ref 13–44)
MANUAL SMEAR VERIFICATION: SIGNIFICANT CHANGE UP
MCHC RBC-ENTMCNC: 23 PG — LOW (ref 27–34)
MCHC RBC-ENTMCNC: 29.3 GM/DL — LOW (ref 32–36)
MCV RBC AUTO: 78.5 FL — LOW (ref 80–100)
MICROCYTES BLD QL: SLIGHT — SIGNIFICANT CHANGE UP
MONOCYTES # BLD AUTO: 1.07 K/UL — HIGH (ref 0–0.9)
MONOCYTES NFR BLD AUTO: 9.5 % — SIGNIFICANT CHANGE UP (ref 2–14)
NEUTROPHILS # BLD AUTO: 8.18 K/UL — HIGH (ref 1.8–7.4)
NEUTROPHILS NFR BLD AUTO: 72.5 % — SIGNIFICANT CHANGE UP (ref 43–77)
OVALOCYTES BLD QL SMEAR: SLIGHT — SIGNIFICANT CHANGE UP
PLAT MORPH BLD: NORMAL — SIGNIFICANT CHANGE UP
PLATELET # BLD AUTO: 318 K/UL — SIGNIFICANT CHANGE UP (ref 150–400)
POIKILOCYTOSIS BLD QL AUTO: SLIGHT — SIGNIFICANT CHANGE UP
POLYCHROMASIA BLD QL SMEAR: SLIGHT — SIGNIFICANT CHANGE UP
POTASSIUM SERPL-MCNC: 4.6 MMOL/L — SIGNIFICANT CHANGE UP (ref 3.5–5.3)
POTASSIUM SERPL-SCNC: 4.6 MMOL/L — SIGNIFICANT CHANGE UP (ref 3.5–5.3)
PROCALCITONIN SERPL-MCNC: 0.2 NG/ML — HIGH (ref 0.02–0.1)
PROT SERPL-MCNC: 7.6 GM/DL — SIGNIFICANT CHANGE UP (ref 6–8.3)
RBC # BLD: 4.04 M/UL — LOW (ref 4.2–5.8)
RBC # FLD: 21.2 % — HIGH (ref 10.3–14.5)
RBC BLD AUTO: ABNORMAL
SODIUM SERPL-SCNC: 140 MMOL/L — SIGNIFICANT CHANGE UP (ref 135–145)
WBC # BLD: 11.28 K/UL — HIGH (ref 3.8–10.5)
WBC # FLD AUTO: 11.28 K/UL — HIGH (ref 3.8–10.5)

## 2022-01-13 PROCEDURE — 93018 CV STRESS TEST I&R ONLY: CPT

## 2022-01-13 PROCEDURE — 93010 ELECTROCARDIOGRAM REPORT: CPT

## 2022-01-13 PROCEDURE — 78452 HT MUSCLE IMAGE SPECT MULT: CPT | Mod: 26

## 2022-01-13 PROCEDURE — 99232 SBSQ HOSP IP/OBS MODERATE 35: CPT

## 2022-01-13 PROCEDURE — 99239 HOSP IP/OBS DSCHRG MGMT >30: CPT

## 2022-01-13 PROCEDURE — 93016 CV STRESS TEST SUPVJ ONLY: CPT

## 2022-01-13 RX ORDER — CLOPIDOGREL BISULFATE 75 MG/1
1 TABLET, FILM COATED ORAL
Qty: 0 | Refills: 0 | DISCHARGE

## 2022-01-13 RX ORDER — METOPROLOL TARTRATE 50 MG
1 TABLET ORAL
Qty: 60 | Refills: 0
Start: 2022-01-13 | End: 2022-02-11

## 2022-01-13 RX ORDER — ENOXAPARIN SODIUM 100 MG/ML
50 INJECTION SUBCUTANEOUS
Qty: 0 | Refills: 0 | DISCHARGE

## 2022-01-13 RX ORDER — LEVETIRACETAM 250 MG/1
1 TABLET, FILM COATED ORAL
Qty: 0 | Refills: 0 | DISCHARGE

## 2022-01-13 RX ORDER — LEVETIRACETAM 250 MG/1
1 TABLET, FILM COATED ORAL
Qty: 60 | Refills: 0
Start: 2022-01-13 | End: 2022-02-11

## 2022-01-13 RX ORDER — CHOLECALCIFEROL (VITAMIN D3) 125 MCG
6000 CAPSULE ORAL
Qty: 60 | Refills: 0
Start: 2022-01-13 | End: 2022-02-11

## 2022-01-13 RX ORDER — CLOPIDOGREL BISULFATE 75 MG/1
1 TABLET, FILM COATED ORAL
Qty: 30 | Refills: 0
Start: 2022-01-13 | End: 2022-02-11

## 2022-01-13 RX ORDER — METOPROLOL TARTRATE 50 MG
1 TABLET ORAL
Qty: 0 | Refills: 0 | DISCHARGE

## 2022-01-13 RX ORDER — AMLODIPINE BESYLATE 2.5 MG/1
1 TABLET ORAL
Qty: 30 | Refills: 0
Start: 2022-01-13 | End: 2022-02-11

## 2022-01-13 RX ORDER — REGADENOSON 0.08 MG/ML
0.4 INJECTION, SOLUTION INTRAVENOUS ONCE
Refills: 0 | Status: COMPLETED | OUTPATIENT
Start: 2022-01-13 | End: 2022-01-13

## 2022-01-13 RX ADMIN — Medication 6000 UNIT(S): at 11:03

## 2022-01-13 RX ADMIN — ENOXAPARIN SODIUM 40 MILLIGRAM(S): 100 INJECTION SUBCUTANEOUS at 11:03

## 2022-01-13 RX ADMIN — Medication 25 MILLIGRAM(S): at 11:03

## 2022-01-13 RX ADMIN — CLOPIDOGREL BISULFATE 75 MILLIGRAM(S): 75 TABLET, FILM COATED ORAL at 11:03

## 2022-01-13 RX ADMIN — AMLODIPINE BESYLATE 5 MILLIGRAM(S): 2.5 TABLET ORAL at 11:02

## 2022-01-13 RX ADMIN — MAGNESIUM OXIDE 400 MG ORAL TABLET 400 MILLIGRAM(S): 241.3 TABLET ORAL at 08:07

## 2022-01-13 RX ADMIN — Medication 100 MILLIGRAM(S): at 11:03

## 2022-01-13 RX ADMIN — Medication 1 TABLET(S): at 11:03

## 2022-01-13 RX ADMIN — SODIUM CHLORIDE 100 MILLILITER(S): 9 INJECTION INTRAMUSCULAR; INTRAVENOUS; SUBCUTANEOUS at 05:36

## 2022-01-13 RX ADMIN — REGADENOSON 0.4 MILLIGRAM(S): 0.08 INJECTION, SOLUTION INTRAVENOUS at 09:36

## 2022-01-13 RX ADMIN — LEVETIRACETAM 500 MILLIGRAM(S): 250 TABLET, FILM COATED ORAL at 08:07

## 2022-01-13 RX ADMIN — Medication 1 MILLIGRAM(S): at 11:03

## 2022-01-13 NOTE — PROGRESS NOTE ADULT - SUBJECTIVE AND OBJECTIVE BOX
Subjective:  Patient seen and examined at bedside earlier today,   Feeling better , no chest pain .      OBJECTIVE:   Vital Signs Last 24 Hrs  T(C): 36.9 (01-13-22 @ 08:00), Max: 37.9 (01-13-22 @ 06:20)  T(F): 98.5 (01-13-22 @ 08:00), Max: 100.2 (01-13-22 @ 06:20)  HR: 67 (01-13-22 @ 08:00) (67 - 82)  BP: 144/62 (01-13-22 @ 08:00) (133/61 - 153/78)  BP(mean): 96 (01-12-22 @ 17:16) (96 - 96)  RR: 16 (01-13-22 @ 08:00) (16 - 18)  SpO2: 100% (01-13-22 @ 08:00) (93% - 100%)  PHYSICAL EXAM:  GENERAL: NAD  Motor Strength 5/5 B/L upper and lower extremities; DTRs 2+ intact and symmetric  HEAD:  Atraumatic, Normocephalic  EYES: EOMI, PERRLA, conjunctiva and sclera clear  HEENT: Moist mucous membranes  NECK: Supple, No JVD  CHEST/LUNG: Clear to auscultation bilaterally; No rales, no rhonchi, no wheezing  HEART: Regular rate and rhythm; No murmurs, no rubs or gallops  ABDOMEN: Soft, Nontender, Nondistended; Bowel sounds present  GENITOURINARY- Voiding, no suprapubic tenderness  EXTREMITIES:  2+ Peripheral Pulses, No clubbing, cyanosis,   edema  MUSCULOSKELETAL:- No muscle tenderness, Muscle tone normal, No joint tenderness, no Joint swelling,  Joint ROM -normal  NERVOUS SYSTEM:  Alert & Oriented X3, non- focal exam,  SKIN-no rash, no lesion    RECENT CULTURES:    RADIOLOGY & ADDITIONAL TESTS: all reviewed   EKG reviewed    Summary     The left ventricle is normal in size, wall thickness, wall motion and   contractility.   Estimated left ventricular ejection fraction is 60-65 %.   Normal appearing left atrium.   The aortic valve is well visualized, appears mildly calcified. Valve   opening seems to be normal.   Fibrocalcific changes noted to the mitral valve leaflets with preserved   leaflet excursion.   EA reversal of the mitral inflow consistent with reduced compliance of   the   left ventricle.   Trace mitral regurgitation is present.   Normal appearing tricuspid valve structure.   Trace tricuspid valve regurgitation is present.   Normal appearing pulmonic valve structure.   Trace pulmonic valvular regurgitation is present.

## 2022-01-13 NOTE — DISCHARGE NOTE PROVIDER - CARE PROVIDER_API CALL
Primary Care, Doctor  Phone: (   )    -  Fax: (   )    -  Follow Up Time:     Neurology, Doctor  Phone: (   )    -  Fax: (   )    -  Follow Up Time:

## 2022-01-13 NOTE — DISCHARGE NOTE PROVIDER - NSDCMRMEDTOKEN_GEN_ALL_CORE_FT
amLODIPine 5 mg oral tablet: 1 tab(s) orally once a day  cholecalciferol oral tablet: 6000 unit(s) orally once a day  clopidogrel 75 mg oral tablet: 1 tab(s) orally once a day  folic acid 1 mg oral tablet: 1 tab(s) orally once a day  levETIRAcetam 500 mg oral tablet: 1 tab(s) orally 2 times a day  magnesium oxide 400 mg oral tablet: 1 tab(s) orally 2 times a day  metoprolol tartrate 25 mg oral tablet: 1 tab(s) orally 2 times a day  Multiple Vitamins oral tablet: 1 tab(s) orally once a day  thiamine 100 mg oral tablet: 1 tab(s) orally once a day

## 2022-01-13 NOTE — DISCHARGE NOTE PROVIDER - HOSPITAL COURSE
72 y/o  Male with PMHX: HTN , MI,  PUD , Alcoholism who was transferred to [facility] from [facility] for Off Loading.  He was admitted to [facility] on 1/9/2022 after a Generalized Tonic Clonic seizure at home and again in the ED.    Pt had been non-compliant with his Keppra and other medications due to his alcohol use.   At [facility] , pt reportedly had chest pain,  an NSTEMI, mild MANUEL and concern for possible Rhabdomyolysis.   He was noted to have anemia H/H 10/35, MCV 79, WBC 13.8  In the ED he received Keppra loading dose of 1000 mg,  NS of 1L bolus.   Pt was AAOx3,  and had a CIWA score of 0.  Pt had a troponin increase from 293 to 2193, and  to 1,368.     He was started on plavix and ASA , then continued with plavix 75mg and lovenox 50 mg .    Initial EKG:  NSR 70 bpm om 1/9/2022 then NSR 65 bpm on 1/10/2022.    Repeat labs also reveal Vit D, 25 hydroxy  13.0,  TSH 0.395,  F T4 1.35, Bun 17.8, Cr 1.33,  GFR 61.0.  Cxray showed probably basilar atelectasis,  CT brain:  mild diffuse cerebral atrophy, chronic white matter microvascular disease.       # NSTEMI Elevated Troponin/- CAD (coronary artery disease).  ·  Recommendation: prior hx of PCI 2 to3 years no details available , Consulted advised the patient to quit smoking , continue statin plavix  follow up echo ,  cont lovenox 50 units, plavix 75mg,  metoprolol. Echo  no Abnormal wall  motion. - Cardiology consult, check fasting lipids. add statin if LFTS wnl . 01/12 Stress likely tomorrow . # Rhabdomyolysis trend CPK` 1209->2079->1478. treated with  IV fluids Encourage drink fluids. # Nicotine dependance, advised the patient to quit smoking. # Peripheral arterial disease. .  with intermittent claudication of RLE with prior hx of arterial stent  continue plavix , encourage to quit smoking. # Seizure Disorder Patient restarted on Keppra, - cont Keppra 500mg po BID. - seizure precautions. # ETOH Abuse - on [facility] protocol . - cont thiamine, folate, MVI,  Mag, Vit D.   #VIt D defic -c/w Vitamine Vitamin D3. -Patient has NM stress was negative. Patient improved and was optimized for discharge ,  Vital Signs Last 24 Hrs  T(C): 36.9 (13 Jan 2022 12:15), Max: 37.9 (13 Jan 2022 06:20)  T(F): 98.5 (13 Jan 2022 12:15), Max: 100.2 (13 Jan 2022 06:20)  HR: 63 (13 Jan 2022 12:15) (63 - 82)  BP: 157/71 (13 Jan 2022 12:15) (133/61 - 157/71)  BP(mean): 96 (12 Jan 2022 17:16) (96 - 96)  RR: 16 (13 Jan 2022 12:15) (16 - 18)  SpO2: 99% (13 Jan 2022 12:15) (93% - 100%)  GENERAL: NAD  NERVOUS SYSTEM:  Alert & Oriented X3, non- focal exam,  Motor Strength 5/5 B/L upper and lower extremities; DTRs 2+ intact and symmetric  HEAD:  Atraumatic, Normocephalic  EYES: EOMI, PERRLA, conjunctiva and sclera clear  HEENT: Moist mucous membranes, poor   NECK: Supple, No JVD  CHEST/LUNG: Clear to auscultation bilaterally; No rales, no rhonchi, no wheezing  HEART: Regular rate and rhythm; No murmurs, no rubs or gallops  ABDOMEN: Soft, Nontender, Nondistended; Bowel sounds present  GENITOURINARY- Voiding, no suprapubic tenderness  EXTREMITIES:  2+ Peripheral Pulses, No clubbing, cyanosis,   edema  MUSCULOSKELETAL:- No muscle tenderness, Muscle tone normal, No joint tenderness, no Joint swelling,  Joint ROM -normal  SKIN-no rash, no lesion  01-13    140  |  109<H>  |  14  ----------------------------<  99  4.6   |  25  |  1.35<H>    Ca    9.5      13 Jan 2022 07:28    TPro  7.6  /  Alb  3.0<L>  /  TBili  0.3  /  DBili  x   /  AST  47<H>  /  ALT  35  /  AlkPhos  103  01-13                            9.3    11.28 )-----------( 318      ( 13 Jan 2022 07:28 )             31.7       CARDIAC MARKERS ( 13 Jan 2022 12:26 )  x     / x     / 1478 U/L / x     / x      CARDIAC MARKERS ( 13 Jan 2022 07:28 )  x     / x     / 1577 U/L / x     / x      CARDIAC MARKERS ( 12 Jan 2022 19:53 )  x     / x     / 1991 U/L / x     / x      CARDIAC MARKERS ( 12 Jan 2022 11:09 )  x     / x     / 2079 U/L / x     / x            LIVER FUNCTIONS - ( 13 Jan 2022 07:28 )  Alb: 3.0 g/dL / Pro: 7.6 gm/dL / ALK PHOS: 103 U/L / ALT: 35 U/L / AST: 47 U/L / GGT: x          NM   IMPRESSION: Normal SPECT Myocardial Perfusion Imaging.  No scan evidence of reversible or fixed perfusion defects.  Normal left ventricular contractility with an ejection fraction of 81%   (Normal: 50% or greater).  No regional wall motion abnormalities.  Please refer to cardiac stress test report for dosage of Regadenoson   administered, EKG findings and symptoms during the procedure.

## 2022-01-13 NOTE — PROGRESS NOTE ADULT - SUBJECTIVE AND OBJECTIVE BOX
REASON FOR VISIT: CAD, Elevated Troponin    HPI:  70 y/o man with a history of HTN , MI, CAD, coronary stent, PAD, stent RLE, PUD, Alcoholism now transferred to [facility] from [facility] where he was being treated for seizures (in setting on nonadherence with antiepileptic drugs); found to have elevated cardiac enzymes prompting cardiology consultation.    1/12/22: Pt comfortable and eager to go home. ETT planned tomorrow  1/13/22:  No angina or dyspnea; "I feel pretty good."    MEDICATIONS  (STANDING):  amLODIPine   Tablet 5 milliGRAM(s) Oral daily  cholecalciferol 6000 Unit(s) Oral daily  clopidogrel Tablet 75 milliGRAM(s) Oral daily  enoxaparin Injectable 40 milliGRAM(s) SubCutaneous two times a day  folic acid 1 milliGRAM(s) Oral daily  levETIRAcetam 500 milliGRAM(s) Oral two times a day  LORazepam   Injectable 1 milliGRAM(s) IV Push once  magnesium oxide 400 milliGRAM(s) Oral two times a day with meals  metoprolol tartrate 25 milliGRAM(s) Oral every 12 hours  multivitamin 1 Tablet(s) Oral daily  sodium chloride 0.9%. 1000 milliLiter(s) (100 mL/Hr) IV Continuous <Continuous>  thiamine 100 milliGRAM(s) Oral daily    Vital Signs Last 24 Hrs  T(C): 36.9 (13 Jan 2022 12:15), Max: 37.9 (13 Jan 2022 06:20)  T(F): 98.5 (13 Jan 2022 12:15), Max: 100.2 (13 Jan 2022 06:20)  HR: 63 (13 Jan 2022 12:15) (63 - 82)  BP: 157/71 (13 Jan 2022 12:15) (133/61 - 157/71)  BP(mean): 96 (12 Jan 2022 17:16) (96 - 96)  RR: 16 (13 Jan 2022 12:15) (16 - 18)  SpO2: 99% (13 Jan 2022 12:15) (93% - 100%)    PHYSICAL EXAM:  Constitutional: NAD, awake and alert  Respiratory: Breath sounds are clear bilaterally, No wheezing, rales or rhonchi  Cardiovascular: S1 and S2, regular rate and rhythm  Gastrointestinal: Bowel Sounds present, soft, nontender.     LABS:   CARDIAC MARKERS ( 13 Jan 2022 07:28 ) x     / x     / 1577 U/L / x     / x      CARDIAC MARKERS ( 12 Jan 2022 19:53 ) x     / x     / 1991 U/L / x     / x      CARDIAC MARKERS ( 12 Jan 2022 11:09 ) x     / x     / 2079 U/L / x     / x                           9.3    11.28 )-----------( 318      ( 13 Jan 2022 07:28 )             31.7     140  |  109<H>  |  14  ----------------------------<  99  4.6   |  25  |  1.35<H>    Ca    9.5      13 Jan 2022 07:28    TPro  7.6  /  Alb  3.0<L>  /  TBili  0.3  /  DBili  x   /  AST  47<H>  /  ALT  35  /  AlkPhos  103  01-13    TroponinI hsT: <-1089.83, <-1351.10             12 Lead ECG (01.12.22 @ 07:16):  Normal sinus rhythm    TTE Echo Complete w/o Contrast w/ Doppler (01.11.22 @ 12:53):   The left ventricle is normal in size, wall thickness, wall motion and contractility. Estimated left ventricular ejection fraction is 60-65 %.   Normal appearing left atrium.   The aortic valve is well visualized, appears mildly calcified. Valve opening seems to be normal.   Fibrocalcific changes noted to the mitral valve leaflets with preserved leaflet excursion.   EA reversal of the mitral inflow consistent with reduced compliance of the left ventricle.   Trace mitral regurgitation is present.   Normal appearing tricuspid valve structure. Trace tricuspid valve regurgitation is present.   Normal appearing pulmonic valve structure. Trace pulmonic valvular regurgitation is present.    Nuclear Stress Pharmacologic Multiple (01.13.22 @ 10:40):  Normal SPECT Myocardial Perfusion Imaging.  No scan evidence of reversible or fixed perfusion defects.  Normal left ventricular contractility with an ejection fraction of 81%  (Normal: 50% or greater).  No regional wall motion abnormalities.

## 2022-01-13 NOTE — DISCHARGE NOTE NURSING/CASE MANAGEMENT/SOCIAL WORK - PATIENT PORTAL LINK FT
You can access the FollowMyHealth Patient Portal offered by [facility] by registering at the following website: [email or web address]. By joining [facility]’s FollowMyHealth portal, you will also be able to view your health information using other applications (apps) compatible with our system.

## 2022-01-13 NOTE — PROGRESS NOTE ADULT - SUBJECTIVE AND OBJECTIVE BOX
Regadenoson Stress Test Report          Regadenoson dose is 0.4 mg/ 5 cc which was given rapidly over 10 seconds  into a peripheral IV.  Immediately after regadenoson injection, flush w/ 5 cc saline given.  Radiopharmaceutical was injected 10-20 sec after the saline flush.  Pt was monitored for 6 minutes.  A 12 lead ECG was recorded every minute & BP was recorded throughout the test.    Resting ECG: Sinus rhythm, normal tracing.  Peak infusion ECG: No additional changes noted.  Chest pain: none    Conclusion:  Normal response to IV lexiscan.  See myocardial perfusion scan report.

## 2022-01-13 NOTE — PROGRESS NOTE ADULT - NUTRITIONAL ASSESSMENT
This patient has been assessed with a concern for Malnutrition and has been determined to have a diagnosis/diagnoses of Severe protein-calorie malnutrition and Underweight (BMI < 19).    This patient is being managed with:   Diet Regular-  Low Fat (LOWFAT)  Low Sodium     Special Instructions for Nursing:  Low Fat (LOWFAT)  Entered: Luis 10 2022 11:59PM    

## 2022-01-13 NOTE — PROGRESS NOTE ADULT - REASON FOR ADMISSION
NSTEMI  Elevated Troponin  Seizure Disorder  Possible Rhabdomyolysis  ETOH Abuse

## 2022-01-13 NOTE — PROGRESS NOTE ADULT - PROBLEM SELECTOR PLAN 1
Stable; normal nuclear SPECT perfusion scan; normal ECG; no angina; elevation of Trop and CPK may have been related to generalized seizure.  Continue antiplt therapy, atorvastatin.    * Stable cardiac status -- will f/u PRN; please call with questions.
elevated troponin with prior hx of CAD PCI in setting of seizure episode without chest pain ?? no significant ST T changes on ekg , possible demand related ischemia , can not rule out NSTEMI , agree with continue plavix, lovenox , statin. Nuclear stress tomorrow

## 2022-01-13 NOTE — PROGRESS NOTE ADULT - ASSESSMENT
# NSTEMI Elevated Troponin  - CAD (coronary artery disease).   ·  Recommendation: prior hx of PCI 2 to3 years no details available , advised the patient to quit smoking , continue statin plavix  follow up echo ,  -direct admit,   admitted to telemetry  - cont  ml/hr  - follow CPK and troponins, renal function  - cont lovenox 50 units,plavix 75mg,  metoprolol  - echo as above no Abnormal wall  motion   - Cardiology consult, check fasting lipids  - add statin if LFTS wnl  01/12 Stress likely tomorrow     # Rhabdomyolysis  trend CPK` 1209->2079   - Cw IV fluids     # Nicotine dependance   -advised the patient to quit smoking    # Peripheral arterial disease.    with intermittent claudication of RLE with prior hx of arterial stent ,   continue plavix , encourage to quit smoking.    # Seizure Disorder  Patient restarted on Keppra   - cont Keppra 500mg po BID  - seizure precautions    # ETOH Abuse  - on CIWA protocol   - cont thiamine, folate, MVI,  Mag, Vit D    #VIt D defic  -c/w Vitamine Vitamin D3  - DVT proph: pt on lovenox  - Adv Dir: Full Code

## 2022-01-13 NOTE — DISCHARGE NOTE PROVIDER - DETAILS OF MALNUTRITION DIAGNOSIS/DIAGNOSES
This patient has been assessed with a concern for Malnutrition and was treated during this hospitalization for the following Nutrition diagnosis/diagnoses:     -  01/11/2022: Severe protein-calorie malnutrition   -  01/11/2022: Underweight (BMI < 19)

## 2022-01-13 NOTE — DISCHARGE NOTE NURSING/CASE MANAGEMENT/SOCIAL WORK - NSDCPEFALRISK_GEN_ALL_CORE
For information on Fall & Injury Prevention, visit: [email or web address] OR  [email or web address] OR  https://www.cdc.gov/steadi/patient.html

## 2022-01-13 NOTE — DISCHARGE NOTE PROVIDER - PROVIDER TOKENS
FREE:[LAST:[Primary Care],FIRST:[Doctor],PHONE:[(   )    -],FAX:[(   )    -]],FREE:[LAST:[Neurology],FIRST:[Doctor],PHONE:[(   )    -],FAX:[(   )    -]]

## 2022-01-13 NOTE — DISCHARGE NOTE PROVIDER - NSDCCPCAREPLAN_GEN_ALL_CORE_FT
PRINCIPAL DISCHARGE DIAGNOSIS  Diagnosis: NSTEMI (non-ST elevation myocardial infarction)  Assessment and Plan of Treatment:       SECONDARY DISCHARGE DIAGNOSES  Diagnosis: Peripheral arterial disease  Assessment and Plan of Treatment:     Diagnosis: CAD (coronary artery disease)  Assessment and Plan of Treatment:     Diagnosis: CAD (coronary artery disease)  Assessment and Plan of Treatment:     Diagnosis: Peripheral arterial disease  Assessment and Plan of Treatment:     Diagnosis: Elevated troponin  Assessment and Plan of Treatment:     Diagnosis: Seizure  Assessment and Plan of Treatment:     Diagnosis: Severe protein-calorie malnutrition  Assessment and Plan of Treatment: